# Patient Record
Sex: MALE | Employment: FULL TIME | URBAN - METROPOLITAN AREA
[De-identification: names, ages, dates, MRNs, and addresses within clinical notes are randomized per-mention and may not be internally consistent; named-entity substitution may affect disease eponyms.]

---

## 2017-07-02 ENCOUNTER — HOSPITAL ENCOUNTER (EMERGENCY)
Facility: CLINIC | Age: 47
Discharge: HOME | End: 2017-07-02
Attending: EMERGENCY MEDICINE | Admitting: EMERGENCY MEDICINE
Payer: OTHER MISCELLANEOUS

## 2017-07-02 ENCOUNTER — APPOINTMENT (OUTPATIENT)
Dept: GENERAL RADIOLOGY | Facility: CLINIC | Age: 47
End: 2017-07-02
Attending: EMERGENCY MEDICINE
Payer: OTHER MISCELLANEOUS

## 2017-07-02 VITALS
HEIGHT: 66 IN | OXYGEN SATURATION: 99 % | HEART RATE: 70 BPM | RESPIRATION RATE: 18 BRPM | DIASTOLIC BLOOD PRESSURE: 86 MMHG | WEIGHT: 240 LBS | BODY MASS INDEX: 38.57 KG/M2 | TEMPERATURE: 98 F | SYSTOLIC BLOOD PRESSURE: 137 MMHG

## 2017-07-02 DIAGNOSIS — S42.401A: ICD-10-CM

## 2017-07-02 DIAGNOSIS — S52.044A CLOSED NONDISPLACED FRACTURE OF CORONOID PROCESS OF RIGHT ULNA, INITIAL ENCOUNTER: ICD-10-CM

## 2017-07-02 DIAGNOSIS — S30.0XXA LUMBAR CONTUSION, INITIAL ENCOUNTER: ICD-10-CM

## 2017-07-02 PROCEDURE — 72100 X-RAY EXAM L-S SPINE 2/3 VWS: CPT

## 2017-07-02 PROCEDURE — 73080 X-RAY EXAM OF ELBOW: CPT | Mod: RT

## 2017-07-02 PROCEDURE — 29105 APPLICATION LONG ARM SPLINT: CPT | Mod: RT

## 2017-07-02 PROCEDURE — 25000132 ZZH RX MED GY IP 250 OP 250 PS 637: Performed by: EMERGENCY MEDICINE

## 2017-07-02 PROCEDURE — 99284 EMERGENCY DEPT VISIT MOD MDM: CPT | Mod: 25

## 2017-07-02 RX ORDER — IBUPROFEN 600 MG/1
600 TABLET, FILM COATED ORAL ONCE
Status: COMPLETED | OUTPATIENT
Start: 2017-07-02 | End: 2017-07-02

## 2017-07-02 RX ORDER — IBUPROFEN 200 MG
600 TABLET ORAL EVERY 6 HOURS PRN
Qty: 60 TABLET | Refills: 0 | Status: SHIPPED | OUTPATIENT
Start: 2017-07-02 | End: 2019-08-10

## 2017-07-02 RX ADMIN — IBUPROFEN 600 MG: 600 TABLET ORAL at 10:43

## 2017-07-02 NOTE — LETTER
[facility] EMERGENCY DEPARTMENT  [address]  [facility] [address]  [phone]    Parth Valdez  [address]   [facility] [address]  [phone] (home) none (work)    : 1970      To Whom it may concern:    Parth Valdez was seen in our Emergency Department today, 2017. He sustained an injury to his right arm that will make it difficult to perform his job duties until healed. The patient does have full use of his left arm, and if there are duties he can perform that do not require the use of his right arm, he can return in that limited capacity. It would be reasonable to allow him to rest for the next 1-2 days prior to returning to work. He should be evaluated by his primary care physician, an orthopedic surgeon, or an occupational medicine physician for determination of return to full duty.       Sincerely,            Mario Kiran D.O.

## 2017-07-02 NOTE — ED AVS SNAPSHOT
[facility] Emergency Department    [address]    [facility] [address]    Phone:  [phone]    Fax:  [phone]                                       Parth Valdez   MRN: [id number]    Department:  [facility] Emergency Department   Date of Visit:  7/2/2017           After Visit Summary Signature Page     I have received my discharge instructions, and my questions have been answered. I have discussed any challenges I see with this plan with the nurse or doctor.    ..........................................................................................................................................  Patient/Patient Representative Signature      ..........................................................................................................................................  Patient Representative Print Name and Relationship to Patient    ..................................................               ................................................  Date                                            Time    ..........................................................................................................................................  Reviewed by Signature/Title    ...................................................              ..............................................  Date                                                            Time

## 2017-07-02 NOTE — ED AVS SNAPSHOT
[facility] Emergency Department    [address] [facility] [address]    Phone:  [phone]    Fax:  [phone]                                       Parth Valdez   MRN: [id number]    Department:  [facility] Emergency Department   Date of Visit:  7/2/2017           Patient Information     Date Of Birth          1970        Your diagnoses for this visit were:     Closed nondisplaced fracture of coronoid process of right ulna, initial encounter     Occult fracture of elbow, right, closed, initial encounter        You were seen by Mario Kiran DO.      Follow-up Information     Call [facility].    Specialty:  Family Medicine    Why:  As needed, if you need to establish care with a new primary care physician    Contact information:    [address]  [facility] [address] [phone]        Follow up with [facility]. Call in 1 week.    Why:  For monitoring of fracture healing    Contact information:    [address]  [address]  [facility] [id number] [phone]        Follow up with [facility] Emergency Department.    Specialty:  EMERGENCY MEDICINE    Why:  If symptoms worsen    Contact information:    [address] [facility] [id number] [phone]        Discharge Instructions         Fractura de codo    Tiene zuhair fractura (rotura) en chele o más huesos de henry articulación del codo. Puede tratarse de zuhair pequeña grieta (fisura) en el hueso. O puede ser algo más lacie, con partes rotas que se salen de henry posición normal.  Esta fractura suele halima entre 4 y 12 semanas en sanar, según el tipo. El primer paso del tratamiento es colocarle zuhair férula o un yeso. En las fracturas graves, se necesita cirugía para que los fragmentos del hueso vuelvan a estar en henry lugar.  Cuidados en la casa  Siga estos consejos para cuidar de usted en henry  casa:    Mantenga el brazo en alto para reducir el dolor y la hinchazón. Cuando esté sentado o acostado, mantenga el brazo elevado por encima del nivel de henry corazón. Para eso, puede colocar henry brazo sobre un cojín apoyado sobre henry pecho. También puede colocarlo sobre un cojín a henry lado. Es muy importante que tomás eso suzanne los primeros dos días (48 horas) después del momento de la lesión.    Coloque zuhair compresa de hielo sobre la lily lesionada. Hágalo suzanne 20 minutos cada zuhair o dos horas el primer día. Para formar zuhair compresa de hielo, puede envolver con zuhair toalla delgada zuhair bolsa plástica con cubos de hielo. Puede colocar la compresa de hielo dentro del cabestrillo y directamente sobre la férula o el yeso. Siga usando la compresa de hielo dirk o cuatro veces al día en los dos lauren siguientes. Luego, úsela cuando lo necesite para aliviar el dolor y la inflamación.    Mantenga siempre el yeso o la férula completamente secos en todo momento. Cuando se duche o se bañe, proteja la férula o el yeso para que no se mojen. Cúbralos con zuhair bolsa plástica lacie, cerrada con cinta en la parte superior. Si la férula o el yeso de fibra de porfirio se humedecen, puede secarlos con un secador para el mohit.    Puede usar acetaminofén o ibuprofeno para controlar el dolor, a menos que le hayan recetado otro medicamento para calmar el dolor. Si tiene zuhair enfermedad crónica del hígado o de los riñones, consulte a henry proveedor antes de usar estos medicamentos. También hable con henry proveedor si tiene zuhair úlcera de estómago o sangrado gastrointestinal.    No ponga cremas ni objetos dentro del yeso si le pica.  Visita de control  Programe zuhair visita de control con henry proveedor de atención médica en zuhair semana, o según le hayan indicado. Es para asegurarse de que el hueso está recuperándose peter. Si le colocaron zuhair férula, quizás se la cambien por un yeso en la próxima visita de control.  Si le tomaron radiografías, las  evaluará un radiólogo. Le informarán de los nuevos hallazgos que puedan afectar henry atención médica.   Cuándo debe buscar atención médica?  Llame a henry proveedor de atención médica de inmediato si tiene cualquiera de los siguientes síntomas:    El yeso se agrieta    El yeso o la férula (de yeso) se humedecen o se ablandan    La férula o el yeso de fibra de porfirio permanecen húmedos por más de 24 horas    Siente más dolor debajo del yeso o la férula, o siente que el yeso o la férula le quedan más ajustados    Siente mal olor que viene del yeso o el líquido de la herida lina el yeso    Se le hinchan los dedos, o los siente fríos o entumecidos, se kaleb azulados, o siente cosquilleo en derrell lily    No puede  los dedos    La piel alrededor del yeso se ve enrojecida    Tiene fiebre de 101  F (38.3  C) o más, o según le haya indicado henry proveedor de atención médica  Date Last Reviewed: 2/15/2015    [id number] [employer]. [address] [place] [address]. Todos los derechos reservados. Esta información no pretende sustituir la atención médica profesional. Sólo henry médico puede diagnosticar y tratar un problema de forest.          24 Hour Appointment Hotline       To make an appointment at any [facility] clinic, call [phone] ([phone]). If you don't have a family doctor or clinic, we will help you find one. [facility] clinics are conveniently located to serve the needs of you and your family.             Review of your medicines      START taking        Dose / Directions Last dose taken    ibuprofen 200 MG tablet   Commonly known as:  ADVIL/MOTRIN   Dose:  600 mg   Quantity:  60 tablet        Take 3 tablets (600 mg) by mouth every 6 hours as needed for mild pain or fever   Refills:  0          Our records show that you are taking the medicines listed below. If these are incorrect, please call your family doctor or clinic.        Dose / Directions Last dose taken    COLD & HOT MEDICATED PATCH  5 % Pads   Quantity:  5 Each   Generic drug:  menthol        Apply to right shoulder as needed for pain relief   Refills:  0        NAPROSYN PO        Refills:  0        Phentermine HCl 30 MG Tbdp        Refills:  0                Prescriptions were sent or printed at these locations (1 Prescription)                   Other Prescriptions                Printed at Department/Unit printer (1 of 1)         ibuprofen (ADVIL/MOTRIN) 200 MG tablet                Procedures and tests performed during your visit     Elbow XR, G/E 3 views, right    Lumbar spine XR, 2-3 views      Orders Needing Specimen Collection     None      Pending Results     Date and Time Order Name Status Description    7/2/2017 1038 Elbow XR, G/E 3 views, right Preliminary     7/2/2017 1038 Lumbar spine XR, 2-3 views Preliminary             Pending Culture Results     No orders found from 6/30/2017 to 7/3/2017.            Pending Results Instructions     If you had any lab results that were not finalized at the time of your Discharge, you can call the ED Lab Result RN at [phone]. You will be contacted by this team for any positive Lab results or changes in treatment. The nurses are available 7 days a week from 10A to 6:30P.  You can leave a message 24 hours per day and they will return your call.        Test Results From Your Hospital Stay        7/2/2017 11:23 AM      Narrative     LUMBAR SPINE TWO TO THREE VIEWS  7/2/2017 11:08 AM     HISTORY: Fall, midline lumbar spine pain.    COMPARISON: None.        Impression     IMPRESSION: No acute fracture. Diffuse degenerative disc disease  change.         7/2/2017 11:23 AM      Narrative     RIGHT ELBOW THREE OR MORE VIEWS  7/2/2017 11:09 AM     HISTORY: Fall, right elbow injury.    COMPARISON: None.        Impression     IMPRESSION: Lateral view suggests a positive posterior fat-pad sign  adjacent to the distal humerus at the humerus that may be seen with  occult fracture. There is also very small  cortical irregularity along  the anterior aspect of the coronoid process that could represent a  small fracture fragment. No other displaced fracture visualized.  Anatomic alignment.                Clinical Quality Measure: Blood Pressure Screening     Your blood pressure was checked while you were in the emergency department today. The last reading we obtained was  BP: 139/81 . Please read the guidelines below about what these numbers mean and what you should do about them.  If your systolic blood pressure (the top number) is less than 120 and your diastolic blood pressure (the bottom number) is less than 80, then your blood pressure is normal. There is nothing more that you need to do about it.  If your systolic blood pressure (the top number) is 120-139 or your diastolic blood pressure (the bottom number) is 80-89, your blood pressure may be higher than it should be. You should have your blood pressure rechecked within a year by a primary care provider.  If your systolic blood pressure (the top number) is 140 or greater or your diastolic blood pressure (the bottom number) is 90 or greater, you may have high blood pressure. High blood pressure is treatable, but if left untreated over time it can put you at risk for heart attack, stroke, or kidney failure. You should have your blood pressure rechecked by a primary care provider within the next 4 weeks.  If your provider in the emergency department today gave you specific instructions to follow-up with your doctor or provider even sooner than that, you should follow that instruction and not wait for up to 4 weeks for your follow-up visit.        Thank you for choosing [facility]       Thank you for choosing [facility] for your care. Our goal is always to provide you with excellent care. Hearing back from our patients is one way we can continue to improve our services. Please take a few minutes to complete the written survey that you may receive in the mail after you  "visit with us. Thank you!        [employer] Information     [employer] lets you send messages to your doctor, view your test results, renew your prescriptions, schedule appointments and more. To sign up, go to [email or web address] . Click on \"Log in\" on the left side of the screen, which will take you to the Welcome page. Then click on \"Sign up Now\" on the right side of the page.     You will be asked to enter the access code listed below, as well as some personal information. Please follow the directions to create your username and password.     Your access code is: [email or web address]  Expires: 2017 12:28 PM     Your access code will  in 90 days. If you need help or a new code, please call your [facility] clinic or [phone].        Care EveryWhere ID     This is your Care EveryWhere ID. This could be used by other organizations to access your [facility] medical records  [id number]        Equal Access to Services     BEV VICTORIA : Hadii trinidad hamm hadasho Soermaali, waaxda luqadaha, qaybta kaalmada adeegyaavi, grant pantoja . So [facility] [phone].    ATENCIÓN: Si habla español, tiene a henry disposición servicios gratuitos de asistencia lingüística. Llame al [phone].    We comply with applicable federal civil rights laws and [place] laws. We do not discriminate on the basis of race, color, national origin, age, disability sex, sexual orientation or gender identity.            After Visit Summary       This is your record. Keep this with you and show to your community pharmacist(s) and doctor(s) at your next visit.                  "

## 2017-07-02 NOTE — ED PROVIDER NOTES
"  History     Chief Complaint:  Arm Pain and Fall    The patient is [place]-speaking. I used the language line for interpretation.    HPI: Parth Valdez is a 46 year old male who presents for evaluation of the above.  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  The patient denies hitting his head.  He denies any lightheadedness, syncope, nausea, vomiting.  He denies any weakness or numbness.      Allergies:  NKDA    Medications:    Phentermine (patient states he is still taking this)    Past Medical History:    None    Past Surgical History:    Umbilical Hernia    Family History:    Non-contributory    Social History:  Tobacco: No  Alcohol: No  Drugs: No    Review of Systems  Please see the HPI. A comprehensive 10 point review of systems was performed and is otherwise negative except as noted in HPI.     Physical Exam   Vital signs:  Patient Vitals for the past 24 hrs:   BP Temp Temp src Pulse Resp SpO2 Height Weight   07/02/17 1045 139/81 - - 68 18 99 % - -   07/02/17 1007 154/69 98  F (36.7  C) Oral 67 16 98 % 1.676 m (5' 6\") 108.9 kg (240 lb)       Constitutional: Patient appears well-developed and well-nourished. There is mild distress.   Head: No external signs of trauma noted.  Neck: No JVD noted  Eyes: Pupils are equal, round, and reactive to light.   Cardiovascular: Normal rate, regular rhythm and normal heart sounds.  Exam reveals no gallop and no friction rub.  No murmur heard. Normal peripheral pulses.  Pulmonary/Chest: Effort normal and breath sounds normal. No respiratory distress. Patient has no wheezes. Patient has no rales.   Abdominal: Soft. There is no tenderness.   Ext:  Right Arm    No TTP over distal radius or ulna  He can oppose thumb.  There is soft tissue swelling present over the right elbow  This is a closed injury  He is able to fire Hand/finger flexors and extensors.  There is normal strength against resistance  Sensation and perfusion " intact throughout hand    Back: Midline L-spine tenderness. No paraspinal tenderness  Neurological: Patient is alert and oriented to person, place, and time. Normal sensation to the RUE and right hand.  Skin: Skin is warm and dry. There is no diaphoresis noted.     Emergency Department Course     Imaging:    Lumbar spine XR, 2-3 views   Preliminary Result   IMPRESSION: No acute fracture. Diffuse degenerative disc disease   change.      Elbow XR, G/E 3 views, right   Preliminary Result   IMPRESSION: Lateral view suggests a positive posterior fat-pad sign   adjacent to the distal humerus at the humerus that may be seen with   occult fracture. There is also very small cortical irregularity along   the anterior aspect of the coronoid process that could represent a   small fracture fragment. No other displaced fracture visualized.   Anatomic alignment.          Interventions:  1043: Motrin 600 mg PO    Procedure:    Splint Placement:    A custom Ortho-Glass splint was placed on the patient's right upper extremity secondary to the above x-ray findings.  I assessed for neurovascular function prior to and after the placement of the splint.  The patient has normal sensation and circulation.  He did feel improvement of his symptoms after placement of the splint.  I expect that this splint can be definitive treatment for this patient's fracture.    Emergency Department Course:  The patient was brought to the ED and had the above H&P performed. The patient was sent for radiological imaging. See above for meds/radiology/procedures intervention.     The patient was informed of the results of the above labs/imaging studies.     The patient was discharged in stable condition      Impression & Plan      Medical Decision Making:  This 46-year-old male presents to the ED secondary to back pain and arm pain after a fall at work.  Please see the HPI for specifics.  The patient was found to have a posterior fat pad sign on his right  elbow x-ray as well as a possible coronoid process chip fracture.  I placed the patient in an Ortho-Glass splint.  He tolerated this very well.  The patient will be discharged and should follow-up in the outpatient setting.  He does not have a primary care provider that is listed so I did give him the on-call primary care clinic but I will also give him the orthopedics clinic for routine follow-up to monitor for healing of this.  Anticipatory guidance given prior to discharge to the patient using the language line.      Impression:    ICD-10-CM    1. Closed nondisplaced fracture of coronoid process of right ulna, initial encounter S52.044A    2. Occult fracture of elbow, right, closed, initial encounter S42.401A    3. Lumbar contusion, initial encounter S30.0XXA        Disposition:  discharged to home    Discharge Medications:  New Prescriptions    IBUPROFEN (ADVIL/MOTRIN) 200 MG TABLET    Take 3 tablets (600 mg) by mouth every 6 hours as needed for mild pain or fever         Mario Kiran D.O.  7/2/2017  [facility] EMERGENCY DEPARTMENT           Mario Kiran,   07/02/17 1235    Addendum: updated discharge impressions     Mario Kiran,   07/02/17 1236

## 2017-07-02 NOTE — ED NOTES
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  Patient alert and oriented x3.  Airway, breathing and circulation intact.

## 2017-07-02 NOTE — DISCHARGE INSTRUCTIONS
Contusión en la espalda  Usted tiene zuhair CONTUSIÓN de la espalda. Esta es zuhair magulladura con hinchazón y un poco de sangrado bajo la piel. La piel puede verse algo morada. También puede que sienta dolor y rigidez en la lily de la magulladura, phong no se ha roto ningún hueso.  Las contusiones sanan por sí solas, sin más tratamiento. No obstante, el dolor y el color diferente de la piel tardarán entre semanas y meses en desaparecer.    Cuidados en henry casa  1) Descanse. Evite levantar cosas pesadas, hacer actividades extenuantes o cualquier actividad que le cause dolor.  2) Aplique hielo sobre la lily para reducir el dolor y la inflamación. Coloque cubos de hielo en zuhair bolsa plástica o use zuhair compresa fría. (Envuelva el paquete frío en zuhair toalla delgada. No lo coloque directamente sobre la piel). Aplique sobre la lily lesionada por 20 minutos cada zuhair o dos horas el primer día. Siga aplicando hielo dirk o cuatro veces al día los dos lauren siguientes y luego según sea necesario para aliviar el dolor y la inflamación.  3) Shiner el medicamento analgésico que le hayan recetado. Si no le indicaron ninguno, puede halima acetaminofén, ibuprofeno o naproxeno para aliviar el dolor.  Visitas de control  Programe zuhair visita de control con henry proveedor de atención médica o según le hayan indicado. Llame si no se encuentra mejor al cabo de zuhair o dos semanas.  NOTA: si le tomaron radiografías, un radiólogo las revisará y se le notificará de cualquier nuevo hallazgo que pueda afectar henry atención.  Cuándo debe buscar atención médica  Llame a henry proveedor de atención médica si tiene alguno de los siguientes síntomas:       Dolor nuevo o que empeora    Más inflamación alrededor del moretón    El dolor se extiende a zuhair o ambas piernas    Debilidad o entumecimiento en zuhair o ambas piernas    Pérdida del control de la vejiga o los intestinos    Entumecimiento en la lily genital o la den    Fiebre de 100.4  F (38  C) o más, según le  haya indicado henry proveedor de atención médica  Date Last Reviewed: 6/26/2015 2000-2017 [employer]. [address] [place] [address], [place] [address]. Todos los derechos reservados. Esta información no pretende sustituir la atención médica profesional. Sólo henry médico puede diagnosticar y tratar un problema de forest.        Fractura de codo    Tiene zuhair fractura (rotura) en chele o más huesos de henry articulación del codo. Puede tratarse de zuhair pequeña grieta (fisura) en el hueso. O puede ser algo más lacie, con partes rotas que se salen de henry posición normal.  Esta fractura suele halima entre 4 y 12 semanas en sanar, según el tipo. El primer paso del tratamiento es colocarle zuhair férula o un yeso. En las fracturas graves, se necesita cirugía para que los fragmentos del hueso vuelvan a estar en henry lugar.  Cuidados en la casa  Siga estos consejos para cuidar de usted en henry casa:    Mantenga el brazo en alto para reducir el dolor y la hinchazón. Cuando esté sentado o acostado, mantenga el brazo elevado por encima del nivel de henry corazón. Para eso, puede colocar henry brazo sobre un cojín apoyado sobre henry pecho. También puede colocarlo sobre un cojín a henry lado. Es muy importante que tomás eso suzanne los primeros dos días (48 horas) después del momento de la lesión.    Coloque zuhair compresa de hielo sobre la lily lesionada. Hágalo suzanne 20 minutos cada zuhair o dos horas el primer día. Para formar zuhair compresa de hielo, puede envolver con zuhair toalla delgada zuhair bolsa plástica con cubos de hielo. Puede colocar la compresa de hielo dentro del cabestrillo y directamente sobre la férula o el yeso. Siga usando la compresa de hielo dirk o cuatro veces al día en los dos lauren siguientes. Luego, úsela cuando lo necesite para aliviar el dolor y la inflamación.    Mantenga siempre el yeso o la férula completamente secos en todo momento. Cuando se duche o se bañe, proteja la férula o el yeso para que no se mojen. Cúbralos con zuhair  bolsa plástica lacie, cerrada con cinta en la parte superior. Si la férula o el yeso de fibra de porfirio se humedecen, puede secarlos con un secador para el mohit.    Puede usar acetaminofén o ibuprofeno para controlar el dolor, a menos que le hayan recetado otro medicamento para calmar el dolor. Si tiene zuhair enfermedad crónica del hígado o de los riñones, consulte a henry proveedor antes de usar estos medicamentos. También hable con henry proveedor si tiene zuhair úlcera de estómago o sangrado gastrointestinal.    No ponga cremas ni objetos dentro del yeso si le pica.  Visita de control  Programe zuhair visita de control con henry proveedor de atención médica en zuhair semana, o según le hayan indicado. Es para asegurarse de que el hueso está recuperándose peter. Si le colocaron zuhair férula, quizás se la cambien por un yeso en la próxima visita de control.  Si le tomaron radiografías, las evaluará un radiólogo. Le informarán de los nuevos hallazgos que puedan afectar henry atención médica.   Cuándo debe buscar atención médica?  Llame a henry proveedor de atención médica de inmediato si tiene cualquiera de los siguientes síntomas:    El yeso se agrieta    El yeso o la férula (de yeso) se humedecen o se ablandan    La férula o el yeso de fibra de porfirio permanecen húmedos por más de 24 horas    Siente más dolor debajo del yeso o la férula, o siente que el yeso o la férula le quedan más ajustados    Siente mal olor que viene del yeso o el líquido de la herida lina el yeso    Se le hinchan los dedos, o los siente fríos o entumecidos, se kaleb azulados, o siente cosquilleo en derrell lily    No puede  los dedos    La piel alrededor del yeso se ve enrojecida    Tiene fiebre de 101  F (38.3  C) o más, o según le haya indicado henry proveedor de atención médica  Date Last Reviewed: 2/15/2015    [id number] [employer] [address] [place] [address], [place] [address]. Todos los derechos reservados. Esta información no pretende sustituir la  atención médica profesional. Sólo henry médico puede diagnosticar y tratar un problema de forest.

## 2017-12-20 ENCOUNTER — OFFICE VISIT (OUTPATIENT)
Dept: INTERNAL MEDICINE | Facility: CLINIC | Age: 47
End: 2017-12-20
Payer: COMMERCIAL

## 2017-12-20 VITALS
HEART RATE: 67 BPM | SYSTOLIC BLOOD PRESSURE: 132 MMHG | TEMPERATURE: 97.4 F | BODY MASS INDEX: 38.67 KG/M2 | DIASTOLIC BLOOD PRESSURE: 84 MMHG | WEIGHT: 239.6 LBS | OXYGEN SATURATION: 96 %

## 2017-12-20 DIAGNOSIS — M54.50 BILATERAL LOW BACK PAIN WITHOUT SCIATICA, UNSPECIFIED CHRONICITY: Primary | ICD-10-CM

## 2017-12-20 DIAGNOSIS — R30.0 DYSURIA: ICD-10-CM

## 2017-12-20 LAB
ALBUMIN UR-MCNC: NEGATIVE MG/DL
APPEARANCE UR: CLEAR
BILIRUB UR QL STRIP: NEGATIVE
COLOR UR AUTO: YELLOW
GLUCOSE UR STRIP-MCNC: NEGATIVE MG/DL
HGB UR QL STRIP: ABNORMAL
KETONES UR STRIP-MCNC: NEGATIVE MG/DL
LEUKOCYTE ESTERASE UR QL STRIP: NEGATIVE
NITRATE UR QL: NEGATIVE
PH UR STRIP: 5.5 PH (ref 5–7)
RBC #/AREA URNS AUTO: NORMAL /HPF
SOURCE: ABNORMAL
SP GR UR STRIP: 1.02 (ref 1–1.03)
UROBILINOGEN UR STRIP-ACNC: 0.2 EU/DL (ref 0.2–1)
WBC #/AREA URNS AUTO: NORMAL /HPF

## 2017-12-20 PROCEDURE — 81001 URINALYSIS AUTO W/SCOPE: CPT | Performed by: NURSE PRACTITIONER

## 2017-12-20 PROCEDURE — T1013 SIGN LANG/ORAL INTERPRETER: HCPCS | Mod: U3 | Performed by: NURSE PRACTITIONER

## 2017-12-20 PROCEDURE — 99214 OFFICE O/P EST MOD 30 MIN: CPT | Performed by: NURSE PRACTITIONER

## 2017-12-20 RX ORDER — CYCLOBENZAPRINE HCL 10 MG
5-10 TABLET ORAL 3 TIMES DAILY PRN
Qty: 30 TABLET | Refills: 1 | Status: SHIPPED | OUTPATIENT
Start: 2017-12-20 | End: 2018-01-02

## 2017-12-20 NOTE — NURSING NOTE
"Chief Complaint   Patient presents with     Back Pain     Lower. Radiates to sides when bending and to Rt leg. Offensive odor in urine x's 2 mo       Initial /84  Pulse 67  Temp 97.4  F (36.3  C) (Oral)  Wt 239 lb 9.6 oz (108.7 kg)  SpO2 96%  BMI 38.67 kg/m2 Estimated body mass index is 38.67 kg/(m^2) as calculated from the following:    Height as of 7/2/17: 5' 6\" (1.676 m).    Weight as of this encounter: 239 lb 9.6 oz (108.7 kg).  Medication Reconciliation: complete     Janeth Saleh CMA      "

## 2017-12-20 NOTE — MR AVS SNAPSHOT
"              After Visit Summary   12/20/2017    Parth Valdez    MRN: [id number]           Patient Information     Date Of Birth          1970        Visit Information        Provider Department      12/20/2017 8:00 AM Juan Pablo Issa Sherri Rubin, NP [facility]        Today's Diagnoses     Bilateral low back pain without sciatica, unspecified chronicity    -  1    Dysuria          Care Instructions    Muscle relaxant at bedtime  Consider PT  Push fluids.    Christina Melendez CNP            Follow-ups after your visit        Who to contact     If you have questions or need follow up information about today's clinic visit or your schedule please contact [facility] directly at [phone].  Normal or non-critical lab and imaging results will be communicated to you by MyChart, letter or phone within 4 business days after the clinic has received the results. If you do not hear from us within 7 days, please contact the clinic through [employer] or phone. If you have a critical or abnormal lab result, we will notify you by phone as soon as possible.  Submit refill requests through [employer] or call your pharmacy and they will forward the refill request to us. Please allow 3 business days for your refill to be completed.          Additional Information About Your Visit        MyChart Information     [employer] lets you send messages to your doctor, view your test results, renew your prescriptions, schedule appointments and more. To sign up, go to [email or web address] . Click on \"Log in\" on the left side of the screen, which will take you to the Welcome page. Then click on \"Sign up Now\" on the right side of the page.     You will be asked to enter the access code listed below, as well as some personal information. Please follow the directions to create your username and password.     Your access code is: [email or web address]  Expires: 3/20/2018  9:04 AM     Your access " code will  in 90 days. If you need help or a new code, please call your [facility] clinic or [phone].        Care EveryWhere ID     This is your Care EveryWhere ID. This could be used by other organizations to access your [facility] medical records  [id number]        Your Vitals Were     Pulse Temperature Pulse Oximetry BMI (Body Mass Index)          67 97.4  F (36.3  C) (Oral) 96% 38.67 kg/m2         Blood Pressure from Last 3 Encounters:   17 132/84   17 137/86   16 (!) 160/97    Weight from Last 3 Encounters:   17 239 lb 9.6 oz (108.7 kg)   17 240 lb (108.9 kg)   12/02/15 229 lb (103.9 kg)              We Performed the Following     UA reflex to Microscopic and Culture     Urine Microscopic          Today's Medication Changes          These changes are accurate as of: 17  9:04 AM.  If you have any questions, ask your nurse or doctor.               Start taking these medicines.        Dose/Directions    cyclobenzaprine 10 MG tablet   Commonly known as:  FLEXERIL   Used for:  Bilateral low back pain without sciatica, unspecified chronicity   Started by:  Christina Melendez NP        Dose:  5-10 mg   Take 0.5-1 tablets (5-10 mg) by mouth 3 times daily as needed for muscle spasms   Quantity:  30 tablet   Refills:  1         Stop taking these medicines if you haven't already. Please contact your care team if you have questions.     COLD & HOT MEDICATED PATCH 5 % Pads   Generic drug:  menthol   Stopped by:  Christina Melendez NP           NAPROSYN PO   Stopped by:  Christina Melendez NP           Phentermine HCl 30 MG Tbdp   Stopped by:  Christina Melendez NP                Where to get your medicines      These medications were sent to [facility] [address] [place] [id number]     Phone:  [phone]     cyclobenzaprine 10 MG tablet                Primary Care Provider Fax #    Physician No  Ref-Primary [phone]       No address on file        Equal Access to Services     BEV ESME : Hadii trinidad hamm cameron Gary, wajenniferda luprimitivosheila, chico douglass hammadmarlen, grant rain hayaaalexei cuevaluke mitchell ladeandrealexei charu. [facility] [phone].    ATENCIÓN: Si habla español, tiene a henry disposición servicios gratuitos de asistencia lingüística. Llame al [phone].    We comply with applicable federal civil rights laws and [place] laws. We do not discriminate on the basis of race, color, national origin, age, disability, sex, sexual orientation, or gender identity.            Thank you!     Thank you for choosing [facility]  for your care. Our goal is always to provide you with excellent care. Hearing back from our patients is one way we can continue to improve our services. Please take a few minutes to complete the written survey that you may receive in the mail after your visit with us. Thank you!             Your Updated Medication List - Protect others around you: Learn how to safely use, store and throw away your medicines at www.disposemymeds.org.          This list is accurate as of: 12/20/17  9:04 AM.  Always use your most recent med list.                   Brand Name Dispense Instructions for use Diagnosis    cyclobenzaprine 10 MG tablet    FLEXERIL    30 tablet    Take 0.5-1 tablets (5-10 mg) by mouth 3 times daily as needed for muscle spasms    Bilateral low back pain without sciatica, unspecified chronicity       ibuprofen 200 MG tablet    ADVIL/MOTRIN    60 tablet    Take 3 tablets (600 mg) by mouth every 6 hours as needed for mild pain or fever

## 2017-12-20 NOTE — PROGRESS NOTES
SUBJECTIVE:   Parth Valdez is a 47 year old male who presents to clinic today for the following health issues:    Back pain: Lower. Radiates to sides when bending and to Rt leg. Offensive odor in urine x's 2 m      Back Pain       Duration: 2 months        Specific cause: none    Description:   Location of pain: low back bilateral  Character of pain: dull ache  Pain radiation:radiates into the right leg  New numbness or weakness in legs, not attributed to pain:  no     Intensity: mild, moderate    History:   Pain interferes with job: No  History of back problems: recurrent self limited episodes of low back pain in the past  Any previous MRI or X-rays: None  Sees a specialist for back pain:  No  Therapies tried without relief: none    Alleviating factors:   Improved by: none      Precipitating factors:  Worsened by: Lifting and Bending    Functional and Psychosocial Screen (Courtney STarT Back):      Not performed today          Accompanying Signs & Symptoms:  Risk of Fracture:  None  Risk of Cauda Equina:  None  Risk of Infection:  None  Risk of Cancer:  None  Risk of Ankylosing Spondylitis:  Onset at age <35, male, AND morning back stiffness. no                   Genitourinary symptoms      Duration: 2 months    Description:  Dark urine with strong odor    Intensity:  mild    Accompanying signs and symptoms (fever/discharge/nausea/vomiting/back or abdominal pain):  None    History (frequent UTI's/kidney stones/prostate problems): None  Sexually active: YES    Precipitating or alleviating factors: None    Therapies tried and outcome: none           There is no problem list on file for this patient.    History reviewed. No pertinent surgical history.    Social History   Substance Use Topics     Smoking status: Never Smoker     Smokeless tobacco: Never Used     Alcohol use No     Family History   Problem Relation Age of Onset     Hypertension Mother      DIABETES Father          Current Outpatient  Prescriptions   Medication Sig Dispense Refill     cyclobenzaprine (FLEXERIL) 10 MG tablet Take 0.5-1 tablets (5-10 mg) by mouth 3 times daily as needed for muscle spasms 30 tablet 1     ibuprofen (ADVIL/MOTRIN) 200 MG tablet Take 3 tablets (600 mg) by mouth every 6 hours as needed for mild pain or fever 60 tablet 0     BP Readings from Last 3 Encounters:   12/20/17 132/84   07/02/17 137/86   04/01/16 (!) 160/97    Wt Readings from Last 3 Encounters:   12/20/17 239 lb 9.6 oz (108.7 kg)   07/02/17 240 lb (108.9 kg)   12/02/15 229 lb (103.9 kg)                        Reviewed and updated as needed this visit by clinical staffTobacco  Allergies  Meds  Med Hx  Surg Hx  Fam Hx  Soc Hx      Reviewed and updated as needed this visit by Provider         ROS:  C: NEGATIVE for fever, chills, change in weight  E/M: NEGATIVE for ear, mouth and throat problems  R: NEGATIVE for significant cough or SOB  CV: NEGATIVE for chest pain, palpitations or peripheral edema  ROS otherwise negative    OBJECTIVE:     /84  Pulse 67  Temp 97.4  F (36.3  C) (Oral)  Wt 239 lb 9.6 oz (108.7 kg)  SpO2 96%  BMI 38.67 kg/m2  Body mass index is 38.67 kg/(m^2).  GENERAL: healthy, alert and no distress  RESP: lungs clear to auscultation - no rales, rhonchi or wheezes  CV: regular rate and rhythm, normal S1 S2, no S3 or S4, no murmur, click or rub, no peripheral edema and peripheral pulses strong  ABDOMEN: soft, nontender, no hepatosplenomegaly, no masses and bowel sounds normal  NEURO: Normal strength and tone, mentation intact and speech normal  BACK: no CVA tenderness, no paralumbar tenderness  Comprehensive back pain exam:  No tenderness, Range of motion not limited by pain, Lower extremity reflexes within normal limits bilaterally and Straight leg raise negative bilaterally    Results for orders placed or performed in visit on 12/20/17 (from the past 24 hour(s))   UA reflex to Microscopic and Culture   Result Value Ref Range     Color Urine Yellow     Appearance Urine Clear     Glucose Urine Negative NEG^Negative mg/dL    Bilirubin Urine Negative NEG^Negative    Ketones Urine Negative NEG^Negative mg/dL    Specific Gravity Urine 1.020 1.003 - 1.035    Blood Urine Moderate (A) NEG^Negative    pH Urine 5.5 5.0 - 7.0 pH    Protein Albumin Urine Negative NEG^Negative mg/dL    Urobilinogen Urine 0.2 0.2 - 1.0 EU/dL    Nitrite Urine Negative NEG^Negative    Leukocyte Esterase Urine Negative NEG^Negative    Source Midstream Urine    Urine Microscopic   Result Value Ref Range    WBC Urine O - 2 OTO2^O - 2 /HPF    RBC Urine O - 2 OTO2^O - 2 /HPF       ASSESSMENT/PLAN:               ICD-10-CM    1. Bilateral low back pain without sciatica, unspecified chronicity M54.5 cyclobenzaprine (FLEXERIL) 10 MG tablet   2. Dysuria R30.0 UA reflex to Microscopic and Culture     Urine Microscopic       Patient Instructions   Muscle relaxant at bedtime  Consider PT  Push fluids.    Christina Melendez CNP        Christina Melendez, NP  [facility]

## 2017-12-22 ENCOUNTER — HOSPITAL ENCOUNTER (EMERGENCY)
Facility: CLINIC | Age: 47
Discharge: HOME | End: 2017-12-22
Attending: EMERGENCY MEDICINE | Admitting: EMERGENCY MEDICINE
Payer: COMMERCIAL

## 2017-12-22 ENCOUNTER — APPOINTMENT (OUTPATIENT)
Dept: CT IMAGING | Facility: CLINIC | Age: 47
End: 2017-12-22
Attending: EMERGENCY MEDICINE
Payer: COMMERCIAL

## 2017-12-22 VITALS
RESPIRATION RATE: 16 BRPM | SYSTOLIC BLOOD PRESSURE: 138 MMHG | OXYGEN SATURATION: 96 % | TEMPERATURE: 98.5 F | HEART RATE: 82 BPM | DIASTOLIC BLOOD PRESSURE: 82 MMHG

## 2017-12-22 DIAGNOSIS — R30.0 DYSURIA: ICD-10-CM

## 2017-12-22 DIAGNOSIS — R31.29 MICROSCOPIC HEMATURIA: ICD-10-CM

## 2017-12-22 LAB
ALBUMIN UR-MCNC: NEGATIVE MG/DL
ANION GAP SERPL CALCULATED.3IONS-SCNC: 9 MMOL/L (ref 3–14)
APPEARANCE UR: CLEAR
BASOPHILS # BLD AUTO: 0 10E9/L (ref 0–0.2)
BASOPHILS NFR BLD AUTO: 0.5 %
BILIRUB UR QL STRIP: NEGATIVE
BUN SERPL-MCNC: 12 MG/DL (ref 7–30)
CALCIUM SERPL-MCNC: 8.6 MG/DL (ref 8.5–10.1)
CHLORIDE SERPL-SCNC: 104 MMOL/L (ref 94–109)
CO2 SERPL-SCNC: 26 MMOL/L (ref 20–32)
COLOR UR AUTO: ABNORMAL
CREAT SERPL-MCNC: 0.78 MG/DL (ref 0.66–1.25)
DIFFERENTIAL METHOD BLD: NORMAL
EOSINOPHIL # BLD AUTO: 0.1 10E9/L (ref 0–0.7)
EOSINOPHIL NFR BLD AUTO: 1 %
ERYTHROCYTE [DISTWIDTH] IN BLOOD BY AUTOMATED COUNT: 13.3 % (ref 10–15)
GFR SERPL CREATININE-BSD FRML MDRD: >90 ML/MIN/1.7M2
GLUCOSE SERPL-MCNC: 90 MG/DL (ref 70–99)
GLUCOSE UR STRIP-MCNC: NEGATIVE MG/DL
HCT VFR BLD AUTO: 43.8 % (ref 40–53)
HGB BLD-MCNC: 15.6 G/DL (ref 13.3–17.7)
HGB UR QL STRIP: ABNORMAL
IMM GRANULOCYTES # BLD: 0 10E9/L (ref 0–0.4)
IMM GRANULOCYTES NFR BLD: 0.2 %
KETONES UR STRIP-MCNC: NEGATIVE MG/DL
LEUKOCYTE ESTERASE UR QL STRIP: NEGATIVE
LYMPHOCYTES # BLD AUTO: 1.3 10E9/L (ref 0.8–5.3)
LYMPHOCYTES NFR BLD AUTO: 22.1 %
MCH RBC QN AUTO: 30.1 PG (ref 26.5–33)
MCHC RBC AUTO-ENTMCNC: 35.6 G/DL (ref 31.5–36.5)
MCV RBC AUTO: 85 FL (ref 78–100)
MONOCYTES # BLD AUTO: 0.6 10E9/L (ref 0–1.3)
MONOCYTES NFR BLD AUTO: 9.4 %
MUCOUS THREADS #/AREA URNS LPF: PRESENT /LPF
NEUTROPHILS # BLD AUTO: 4 10E9/L (ref 1.6–8.3)
NEUTROPHILS NFR BLD AUTO: 66.8 %
NITRATE UR QL: NEGATIVE
NRBC # BLD AUTO: 0 10*3/UL
NRBC BLD AUTO-RTO: 0 /100
PH UR STRIP: 6 PH (ref 5–7)
PLATELET # BLD AUTO: 212 10E9/L (ref 150–450)
POTASSIUM SERPL-SCNC: 3.5 MMOL/L (ref 3.4–5.3)
RBC # BLD AUTO: 5.18 10E12/L (ref 4.4–5.9)
RBC #/AREA URNS AUTO: 5 /HPF (ref 0–2)
SODIUM SERPL-SCNC: 139 MMOL/L (ref 133–144)
SOURCE: ABNORMAL
SP GR UR STRIP: 1.01 (ref 1–1.03)
UROBILINOGEN UR STRIP-MCNC: 0 MG/DL (ref 0–2)
WBC # BLD AUTO: 6.1 10E9/L (ref 4–11)
WBC #/AREA URNS AUTO: 1 /HPF (ref 0–2)

## 2017-12-22 PROCEDURE — 81001 URINALYSIS AUTO W/SCOPE: CPT | Performed by: EMERGENCY MEDICINE

## 2017-12-22 PROCEDURE — 85025 COMPLETE CBC W/AUTO DIFF WBC: CPT | Performed by: EMERGENCY MEDICINE

## 2017-12-22 PROCEDURE — 87491 CHLMYD TRACH DNA AMP PROBE: CPT | Performed by: EMERGENCY MEDICINE

## 2017-12-22 PROCEDURE — 74176 CT ABD & PELVIS W/O CONTRAST: CPT

## 2017-12-22 PROCEDURE — 96360 HYDRATION IV INFUSION INIT: CPT

## 2017-12-22 PROCEDURE — 25000128 H RX IP 250 OP 636: Performed by: EMERGENCY MEDICINE

## 2017-12-22 PROCEDURE — 87591 N.GONORRHOEAE DNA AMP PROB: CPT | Performed by: EMERGENCY MEDICINE

## 2017-12-22 PROCEDURE — 80048 BASIC METABOLIC PNL TOTAL CA: CPT | Performed by: EMERGENCY MEDICINE

## 2017-12-22 PROCEDURE — 99284 EMERGENCY DEPT VISIT MOD MDM: CPT | Mod: 25

## 2017-12-22 RX ORDER — SODIUM CHLORIDE 9 MG/ML
1000 INJECTION, SOLUTION INTRAVENOUS CONTINUOUS
Status: DISCONTINUED | OUTPATIENT
Start: 2017-12-22 | End: 2017-12-22 | Stop reason: HOSPADM

## 2017-12-22 RX ORDER — HYDROCODONE BITARTRATE AND ACETAMINOPHEN 5; 325 MG/1; MG/1
1-2 TABLET ORAL EVERY 4 HOURS PRN
Qty: 15 TABLET | Refills: 0 | Status: SHIPPED | OUTPATIENT
Start: 2017-12-22 | End: 2018-01-02

## 2017-12-22 RX ADMIN — SODIUM CHLORIDE 1000 ML: 9 INJECTION, SOLUTION INTRAVENOUS at 17:25

## 2017-12-22 ASSESSMENT — ENCOUNTER SYMPTOMS
VOMITING: 0
FLANK PAIN: 0
DIARRHEA: 0
BACK PAIN: 1
CHILLS: 0
FEVER: 0
NAUSEA: 0
DYSURIA: 1
ABDOMINAL PAIN: 0
NECK PAIN: 0
DIAPHORESIS: 0
HEMATURIA: 0
DIFFICULTY URINATING: 1

## 2017-12-22 NOTE — ED NOTES
ABC's intact.  Alert and oriented x4.    Pt c/o painful frequent urination starting yesterday.  Denies fever.

## 2017-12-22 NOTE — ED AVS SNAPSHOT
" [facility] Emergency Department    [address]    [facility] [address]    Phone:  [phone]    Fax:  [phone]                                       Parth Valdez   MRN: [id number]    Department:  [facility] Emergency Department   Date of Visit:  12/22/2017           Patient Information     Date Of Birth          1970        Your diagnoses for this visit were:     Microscopic hematuria     Dysuria        You were seen by Prabhu Sood MD.      Follow-up Information     Follow up with [facility]. Go in 4 days.    Contact information:    [address] [facility]  [address]  [facility] [address] [phone]        Follow up with [facility]. Go in 1 week.    Specialties:  Sports Medicine, Pain Management, Obstetrics & Gynecology, Pediatrics, Internal Medicine, Nephrology    Contact information:    [address] [place] [address]  [facility] [address] [phone]        Discharge Instructions         Disuria     El dolor al orinar (disuria) suele ser consecuencia de un problema en las vías urinarias.   La disuria consiste en zuhair sensación dolorosa al orinar. Siga leyendo para obtener más información sobre la disuria y bailey tratamiento.   Cuáles son las causas de la disuria?  Las posibles causas incluyen:    Infección con zuhair bacteria o virus. Puede producir zuhair infección de las vías urinarias (\"UTI\", por janet siglas en inglés) o zuhair infección de transmisión sexual (\"STI\", por janet siglas en inglés).    Sensibilidad o alergias a ciertas sustancias químicas. Pueden estar en las lociones u otros productos.    Problemas de la próstata o de la vejiga.    Radioterapia en la lily pélvica.    Cómo se diagnostica la disuria?  Bailey proveedor de atención médica le hará un examen y le preguntará sobre janet síntomas y janet antecedentes de forest. Luego de realizarle un examen físico y de hablar con usted, " "probablemente henry proveedor de atención médica sabrá las causas de henry disuria. Generalmente le pedirá zuhair muestra de henry orina. Se realizan exámenes, o \"análisis de orina\" con derrell muestra. Ayala examen puede incluir: observar la muestra de orina (examen visual), verificar la presencia de sustancias (examen químico) y observar zuhair cantidad pequeña con un microscopio (examen microscópico). Algunas partes del análisis de orina se pueden realizar en el consultorio del proveedor y otras se harán en un laboratorio. También es posible que la muestra de orina se analice para determinar si tiene bacterias y hongos levaduriformes (cultivo de orina). Henry proveedor de atención médica le explicará con más detalle estas pruebas en maury de que usted las necesite.   Cómo se trata la disuria?  El tratamiento dependerá de la causa de henry disuria. Si la causa es zuhair infección por bacterias, es posible que le receten antibióticos (medicamentos para combatir la infección). También podrían darle medicamentos para facilitarle orinar y ayudarle a aliviar el dolor. Henry proveedor de atención médica puede darle más información sobre janet opciones de tratamiento. Si los síntomas se logan sin tratar, pueden empeorar.  Llame a henry proveedor de atención médica de inmediato en cualquiera de los siguientes casos:    Fiebre de 100.4  F (38.0  C) o superior    No hay mejoría después de dirk lauren de tratamiento    Dificultad para orinar a causa del dolor    Secreción nueva o en aumento procedente de la vagina o del pene    Erupción cutánea o dolor en las articulaciones    Dolor que va en aumento en la espalda o en el abdomen    Ganglios linfáticos agrandados (masas) y con dolor en las ingles   Date Last Reviewed: 9/24/2014 2000-2017 [employer] [address] [place] [id number]. Todos los derechos reservados. Esta información no pretende sustituir la atención médica profesional. Sólo henry médico puede diagnosticar y tratar un problema " de forest.          Gema En La Orina [Blood In The Urine]    La gema en la orina ( hematuria ) tiene muchas causas posibles. Si se presenta después de zuhair lesión (por ejemplo, por zuhair caída o un accidente de automóvil), suele ser zuhair señal de hematoma (bruising) en el riñón on en la vejiga. Las causas médicas comunes que pueden hacer que haya gema en la orina son: infección del tracto urinario (urinary tract infection), cálculo renal (kidney stone [dusty en los riñones]), inflamación (inflammation), tumores (tumors), u otras enfermedades del riñón o la vejiga. La menstruación (menstruation) puede hacer que aparezca gema en la muestra de orina, aunque no provenga del tracto urinario.  Si sólo hay presente un rastro de gema, aparecerá en la prueba de orina (urine test), aunque la orina se kerline de color amarillento y no spain ni rojizo. [place] puede ocurrir en cualquiera de los casos antes mencionados, así yunior cuando se grey practicado ejercicio físico muy doris o se tiene fiebre renita. En montrell maury, es posible que el médico le solicite repetir la prueba de orina otro día. Eso mostrará si todavía hay gema en la orina. De ser así, se pueden realizar otras pruebas para establecer la causa.  Cuidados En La [place]:  1. Si el aspecto de henry orina no demuestra que haya presencia de gema (la orina no es rosada, amarronada ni rojiza), entonces no es necesario que restrinja henry actividad en forma alguna.  2. Si puede horacio la presencia de gema en la orina, descanse y evite realizar actividades agotadoras hasta henry próxima prueba. No use aspirina, ni medicamentos anticoagulantes ni medicamentos antiplaquetarios ni medicamentos antiinflamatorios, tales yunior ibuprofeno (Advil o Motrin) o naproxeno (Aleve o Naprosyn). Estos medicamentos aligeran la gema y pueden aumentar el sangrado.  Visita de control     Programe zuhair visita de control con henry médico, o según le indique nuestro personal médico. Si tuvo zuhair lesión y tiene  gema en la orina, deberá repetir la prueba de orina en 1 o 2 días. Comuníquese con henry médico o regrese a joaquín centro para hacerse la prueba.     Un radiólogo evaluará las radiografías que le hayan hecho. Le informaremos de los nuevos hallazgos que puedan afectar la atención médica.  Busque Prontamente Atención Médica  si algo de lo siguiente ocurre:    Gema de color cortez vivo o coágulos de gema en la orina (si es un síntoma nuevo).    Debilidad, mareo o desmayo.    Dolor nuevo que se presenta en la den, el abdomen o la espalda.    Fiebre de 100.4 F (38 C) o más renita, o yunior le haya indicado henry proveedor de atención médica.    Vómito persistente.    Sangrado de la nariz o las encías; o se le hacen hematomas (moretones [bruising] con facilidad.  Date Last Reviewed: 12/20/2016 2000-2017 [employer]. [address] [place] [address], [place] [address]. Todos los derechos reservados. Esta información no pretende sustituir la atención médica profesional. Sólo henry médico puede diagnosticar y tratar un problema de forest.          24 Hour Appointment Hotline       To make an appointment at any [facility] clinic, call [phone] ([phone]). If you don't have a family doctor or clinic, we will help you find one. [facility] clinics are conveniently located to serve the needs of you and your family.             Review of your medicines      START taking        Dose / Directions Last dose taken    HYDROcodone-acetaminophen 5-325 MG per tablet   Commonly known as:  NORCO   Dose:  1-2 tablet   Quantity:  15 tablet        Take 1-2 tablets by mouth every 4 hours as needed for moderate to severe pain   Refills:  0          Our records show that you are taking the medicines listed below. If these are incorrect, please call your family doctor or clinic.        Dose / Directions Last dose taken    cyclobenzaprine 10 MG tablet   Commonly known as:  FLEXERIL   Dose:  5-10 mg   Quantity:  30 tablet        Take 0.5-1  tablets (5-10 mg) by mouth 3 times daily as needed for muscle spasms   Refills:  1        ibuprofen 200 MG tablet   Commonly known as:  ADVIL/MOTRIN   Dose:  600 mg   Quantity:  60 tablet        Take 3 tablets (600 mg) by mouth every 6 hours as needed for mild pain or fever   Refills:  0                Prescriptions were sent or printed at these locations (1 Prescription)                   Other Prescriptions                Printed at Department/Unit printer (1 of 1)         HYDROcodone-acetaminophen (NORCO) 5-325 MG per tablet                Procedures and tests performed during your visit     Basic metabolic panel    CBC with platelets differential    CT Abdomen Pelvis without Contrast (stone protocol)    Peripheral IV: Standard    UA with Microscopic      Orders Needing Specimen Collection     None      Pending Results     No orders found from 12/20/2017 to 12/23/2017.            Pending Culture Results     No orders found from 12/20/2017 to 12/23/2017.            Pending Results Instructions     If you had any lab results that were not finalized at the time of your Discharge, you can call the ED Lab Result RN at [phone]. You will be contacted by this team for any positive Lab results or changes in treatment. The nurses are available 7 days a week from 10A to 6:30P.  You can leave a message 24 hours per day and they will return your call.        Test Results From Your Hospital Stay        12/22/2017  2:57 PM      Component Results     Component Value Ref Range & Units Status    Color Urine Straw  Final    Appearance Urine Clear  Final    Glucose Urine Negative NEG^Negative mg/dL Final    Bilirubin Urine Negative NEG^Negative Final    Ketones Urine Negative NEG^Negative mg/dL Final    Specific Gravity Urine 1.010 1.003 - 1.035 Final    Blood Urine Moderate (A) NEG^Negative Final    pH Urine 6.0 5.0 - 7.0 pH Final    Protein Albumin Urine Negative NEG^Negative mg/dL Final    Urobilinogen mg/dL 0.0 0.0 - 2.0  mg/dL Final    Nitrite Urine Negative NEG^Negative Final    Leukocyte Esterase Urine Negative NEG^Negative Final    Source Midstream Urine  Final    WBC Urine 1 0 - 2 /HPF Final    RBC Urine 5 (H) 0 - 2 /HPF Final    Mucous Urine Present (A) NEG^Negative /LPF Final         12/22/2017  5:29 PM      Component Results     Component Value Ref Range & Units Status    WBC 6.1 4.0 - 11.0 10e9/L Final    RBC Count 5.18 4.4 - 5.9 10e12/L Final    Hemoglobin 15.6 13.3 - 17.7 g/dL Final    Hematocrit 43.8 40.0 - 53.0 % Final    MCV 85 78 - 100 fl Final    MCH 30.1 26.5 - 33.0 pg Final    MCHC 35.6 31.5 - 36.5 g/dL Final    RDW 13.3 10.0 - 15.0 % Final    Platelet Count 212 150 - 450 10e9/L Final    Diff Method Automated Method  Final    % Neutrophils 66.8 % Final    % Lymphocytes 22.1 % Final    % Monocytes 9.4 % Final    % Eosinophils 1.0 % Final    % Basophils 0.5 % Final    % Immature Granulocytes 0.2 % Final    Nucleated RBCs 0 0 /100 Final    Absolute Neutrophil 4.0 1.6 - 8.3 10e9/L Final    Absolute Lymphocytes 1.3 0.8 - 5.3 10e9/L Final    Absolute Monocytes 0.6 0.0 - 1.3 10e9/L Final    Absolute Eosinophils 0.1 0.0 - 0.7 10e9/L Final    Absolute Basophils 0.0 0.0 - 0.2 10e9/L Final    Abs Immature Granulocytes 0.0 0 - 0.4 10e9/L Final    Absolute Nucleated RBC 0.0  Final         12/22/2017  5:46 PM      Component Results     Component Value Ref Range & Units Status    Sodium 139 133 - 144 mmol/L Final    Potassium 3.5 3.4 - 5.3 mmol/L Final    Chloride 104 94 - 109 mmol/L Final    Carbon Dioxide 26 20 - 32 mmol/L Final    Anion Gap 9 3 - 14 mmol/L Final    Glucose 90 70 - 99 mg/dL Final    Urea Nitrogen 12 7 - 30 mg/dL Final    Creatinine 0.78 0.66 - 1.25 mg/dL Final    GFR Estimate >90 >60 mL/min/1.7m2 Final    Non  GFR Calc    GFR Estimate If Black >90 >60 mL/min/1.7m2 Final    African American GFR Calc    Calcium 8.6 8.5 - 10.1 mg/dL Final         12/22/2017  5:54 PM      Narrative     Exam: CT  ABDOMEN PELVIS W/O CONTRAST  12/22/2017 5:47 PM    History: Abdominal pain.    Comparison: 12/2/2015    Technique: Volumetric acquisition with reconstruction in the axial,  coronal planes through the abdomen and pelvis without contrast.  Radiation dose for this scan was reduced using automated exposure  control, adjustment of the mA and/or kV according to patient size, or  iterative reconstruction technique.    Contrast: None    Findings:   Lung Bases: Lung bases are clear. No pleural or pericardial effusion.    Abdomen: Unenhanced liver, spleen, gallbladder, adrenal glands,  kidneys and pancreas are normal. Specifically, no hydronephrosis or  hydroureter, no renal or ureteral calculi.    No areas of bowel wall thickening or bowel dilatation. Normal  appendix. No free fluid. No abdominal or pelvic lymphadenopathy.    Bones: No concerning lytic or sclerotic lesions.        Impression     Impression: Normal unenhanced CT of the abdomen and pelvis without  findings to suggest an etiology of the patient's symptoms.    LUCAS TESFAYE MD                Clinical Quality Measure: Blood Pressure Screening     Your blood pressure was checked while you were in the emergency department today. The last reading we obtained was  BP: 138/82 . Please read the guidelines below about what these numbers mean and what you should do about them.  If your systolic blood pressure (the top number) is less than 120 and your diastolic blood pressure (the bottom number) is less than 80, then your blood pressure is normal. There is nothing more that you need to do about it.  If your systolic blood pressure (the top number) is 120-139 or your diastolic blood pressure (the bottom number) is 80-89, your blood pressure may be higher than it should be. You should have your blood pressure rechecked within a year by a primary care provider.  If your systolic blood pressure (the top number) is 140 or greater or your diastolic blood pressure (the bottom  "number) is 90 or greater, you may have high blood pressure. High blood pressure is treatable, but if left untreated over time it can put you at risk for heart attack, stroke, or kidney failure. You should have your blood pressure rechecked by a primary care provider within the next 4 weeks.  If your provider in the emergency department today gave you specific instructions to follow-up with your doctor or provider even sooner than that, you should follow that instruction and not wait for up to 4 weeks for your follow-up visit.        Thank you for choosing [facility]       Thank you for choosing [facility] for your care. Our goal is always to provide you with excellent care. Hearing back from our patients is one way we can continue to improve our services. Please take a few minutes to complete the written survey that you may receive in the mail after you visit with us. Thank you!        [employer] Information     [employer] lets you send messages to your doctor, view your test results, renew your prescriptions, schedule appointments and more. To sign up, go to [email or web address] . Click on \"Log in\" on the left side of the screen, which will take you to the Welcome page. Then click on \"Sign up Now\" on the right side of the page.     You will be asked to enter the access code listed below, as well as some personal information. Please follow the directions to create your username and password.     Your access code is: [email or web address]  Expires: 3/20/2018  9:04 AM     Your access code will  in 90 days. If you need help or a new code, please call your [facility] clinic or [phone].        Care EveryWhere ID     This is your Care EveryWhere ID. This could be used by other organizations to access your [facility] medical records  [id number]        Equal Access to Services     BEV VICTORIA : Artie Gary, ivan lee, grant delarosa. So william " [phone].    ATENCIÓN: Si habla español, tiene a henry disposición servicios gratuitos de asistencia lingüística. Llame al [phone].    We comply with applicable federal civil rights laws and [place] laws. We do not discriminate on the basis of race, color, national origin, age, disability, sex, sexual orientation, or gender identity.            After Visit Summary       This is your record. Keep this with you and show to your community pharmacist(s) and doctor(s) at your next visit.

## 2017-12-22 NOTE — ED PROVIDER NOTES
"  History     Chief Complaint:  Dysuria    HPI   History is limited due to a language barrier. A  is used.    Parth Valdez is a 47 year old male who presents to the ED for evaluation of dysuria. The patient reports that he was seen in clinic yesterday by Christina Melendez NP for urinary symptoms. He reports that they did not find anything remarkable in his urine sample. He was discharged with Flexeril for muscle spasms and he reports some relief with this. Today, he reports having dysuria and difficulty urinating. He states that he feels like he needs to go but \"has to strain\" to do so. He also notes low back pain and notes the pain moves to the right side slightly with movement. He denies any back pain now while in the ED. He denies any hematuria, fever, nausea, vomiting, diarrhea, abdominal pain, flank pain, or any other symptoms.    The 's number was [id number].    Allergies:  No known drug allergies    Medications:    Flexeril  Ibuprofen    Past Medical History:    Hernia    Past Surgical History:    Hernia repair    Family History:    Hypertension  Diabetes    Social History:  Smoking status: Never  Alcohol use: No  Marital Status:       Review of Systems   Constitutional: Negative for chills, diaphoresis and fever.   Gastrointestinal: Negative for abdominal pain, diarrhea, nausea and vomiting.   Genitourinary: Positive for decreased urine volume, difficulty urinating and dysuria. Negative for flank pain and hematuria.   Musculoskeletal: Positive for back pain (low). Negative for neck pain.   All other systems reviewed and are negative.    Physical Exam   Patient Vitals for the past 24 hrs:   BP Temp Temp src Pulse Resp SpO2   12/22/17 1630 - - - - - 98 %   12/22/17 1627 138/86 - - - - -   12/22/17 1343 (!) 165/91 98.5  F (36.9  C) Oral 82 16 98 %     Physical Exam   Constitutional: He appears well-developed and well-nourished.   HENT:   Right Ear: External " ear normal.   Left Ear: External ear normal.   Mouth/Throat: Oropharynx is clear and moist. No oropharyngeal exudate.   TM's clear bilaterally   Eyes: Conjunctivae are normal. Pupils are equal, round, and reactive to light. No scleral icterus.   Neck: Normal range of motion. Neck supple.   Cardiovascular: Normal rate, regular rhythm, normal heart sounds and intact distal pulses.  Exam reveals no gallop and no friction rub.    No murmur heard.  Pulmonary/Chest: Effort normal and breath sounds normal. No respiratory distress. He has no wheezes. He has no rales.   Abdominal: Soft. Bowel sounds are normal. He exhibits no distension and no mass. There is no tenderness.   Mild low left flank pain   Musculoskeletal: Normal range of motion. He exhibits no edema.   Neurological: He is alert.   Skin: Skin is warm and dry. No rash noted.   Psychiatric: He has a normal mood and affect.   Nursing note and vitals reviewed.    Emergency Department Course     Imaging:  Radiographic findings were communicated with the patient who voiced understanding of the findings.    CT-scan Abdomen/Pelvis w/o contrast:  Impression: Normal unenhanced CT of the abdomen and pelvis without  findings to suggest an etiology of the patient's symptoms.  Preliminary result per radiology.     Laboratory:  UA: Moderate blood, Mucous present, RBC 5 (H), o/w Negative  CBC: WNL (WBC 6.1, HGB 15.6, )   BMP: WNL (Creatinine 0.78)    Interventions:  1725: NS 1L IV Bolus    Emergency Department Course:  Past medical records, nursing notes, and vitals reviewed.  1640: I performed an exam of the patient and obtained history, as documented above. GCS 15.    IV inserted and blood drawn.    The patient was sent for a CT-scan while in the emergency department, findings above.    1836: I rechecked the patient. Findings and plan explained to the Patient. Patient discharged home with instructions regarding supportive care, medications, and reasons to return. The  importance of close follow-up was reviewed.     Impression & Plan      Medical Decision Making:  Patient presents with dysuria and some vague back pain. Patient otherwise appears comfortable. He has 5 RBC's in his urine so we did go ahead and rule out renal colic with the hematuria. So far, workup has been negative. I did go ahead and add on STD test even though patient stated that he does not believe he is at risk. Patient will be referred to urology due to microscopic hematuria as well as dysuria. We will have him follow up next week and is sent home with a prescription for Norco for pain if needed. Otherwise, he should return if symptoms worsen.    Diagnosis:    ICD-10-CM   1. Microscopic hematuria R31.29   2. Dysuria R30.0       Disposition:  discharged to home    Discharge Medications:  New Prescriptions    HYDROCODONE-ACETAMINOPHEN (NORCO) 5-325 MG PER TABLET    Take 1-2 tablets by mouth every 4 hours as needed for moderate to severe pain         Sabine Menezes  12/22/2017   [facility] EMERGENCY DEPARTMENT  I, Sabine Menezes, am serving as a scribe at 4:40 PM on 12/22/2017 to document services personally performed by Prabhu Sood MD based on my observations and the provider's statements to me.        Prabhu Sood MD  12/22/17 9467

## 2017-12-23 NOTE — DISCHARGE INSTRUCTIONS
"  Disuria     El dolor al orinar (disuria) suele ser consecuencia de un problema en las vías urinarias.   La disuria consiste en zuhair sensación dolorosa al orinar. Siga leyendo para obtener más información sobre la disuria y henry tratamiento.   Cuáles son las causas de la disuria?  Las posibles causas incluyen:    Infección con zuhair bacteria o virus. Puede producir zuhair infección de las vías urinarias (\"UTI\", por janet siglas en inglés) o zuhair infección de transmisión sexual (\"STI\", por janet siglas en inglés).    Sensibilidad o alergias a ciertas sustancias químicas. Pueden estar en las lociones u otros productos.    Problemas de la próstata o de la vejiga.    Radioterapia en la lily pélvica.    Cómo se diagnostica la disuria?  Henry proveedor de atención médica le hará un examen y le preguntará sobre janet síntomas y janet antecedentes de forest. Luego de realizarle un examen físico y de hablar con usted, probablemente henry proveedor de atención médica sabrá las causas de henry disuria. Generalmente le pedirá zuhair muestra de henry orina. Se realizan exámenes, o \"análisis de orina\" con derrell muestra. Ayala examen puede incluir: observar la muestra de orina (examen visual), verificar la presencia de sustancias (examen químico) y observar zuhair cantidad pequeña con un microscopio (examen microscópico). Algunas partes del análisis de orina se pueden realizar en el consultorio del proveedor y otras se harán en un laboratorio. También es posible que la muestra de orina se analice para determinar si tiene bacterias y hongos levaduriformes (cultivo de orina). Henry proveedor de atención médica le explicará con más detalle estas pruebas en maury de que usted las necesite.   Cómo se trata la disuria?  El tratamiento dependerá de la causa de henry disuria. Si la causa es zuhair infección por bacterias, es posible que le receten antibióticos (medicamentos para combatir la infección). También podrían darle medicamentos para facilitarle orinar y ayudarle a aliviar el " dolor. Henry proveedor de atención médica puede darle más información sobre janet opciones de tratamiento. Si los síntomas se logan sin tratar, pueden empeorar.  Llame a henry proveedor de atención médica de inmediato en cualquiera de los siguientes casos:    Fiebre de 100.4  F (38.0  C) o superior    No hay mejoría después de dirk lauren de tratamiento    Dificultad para orinar a causa del dolor    Secreción nueva o en aumento procedente de la vagina o del pene    Erupción cutánea o dolor en las articulaciones    Dolor que va en aumento en la espalda o en el abdomen    Ganglios linfáticos agrandados (masas) y con dolor en las ingles   Date Last Reviewed: 9/24/2014 2000-2017 [employer]. [address] [place] [address]. Todos los derechos reservados. Esta información no pretende sustituir la atención médica profesional. Sólo henry médico puede diagnosticar y tratar un problema de forest.          Gema En La Orina [Blood In The Urine]    La gema en la orina ( hematuria ) tiene muchas causas posibles. Si se presenta después de zuhair lesión (por ejemplo, por zuhair caída o un accidente de automóvil), suele ser zuhair señal de hematoma (bruising) en el riñón on en la vejiga. Las causas médicas comunes que pueden hacer que haya gema en la orina son: infección del tracto urinario (urinary tract infection), cálculo renal (kidney stone [dusty en los riñones]), inflamación (inflammation), tumores (tumors), u otras enfermedades del riñón o la vejiga. La menstruación (menstruation) puede hacer que aparezca gema en la muestra de orina, aunque no provenga del tracto urinario.  Si sólo hay presente un rastro de gema, aparecerá en la prueba de orina (urine test), aunque la orina se kerline de color amarillento y no spain ni rojizo. Magee puede ocurrir en cualquiera de los casos antes mencionados, así yunior cuando se grey practicado ejercicio físico muy doris o se tiene fiebre renita. En montrell maury, es posible que el médico  le solicite repetir la prueba de orina otro día. Eso mostrará si todavía hay gema en la orina. De ser así, se pueden realizar otras pruebas para establecer la causa.  Cuidados En La [place]:  1. Si el aspecto de henry orina no demuestra que haya presencia de gema (la orina no es rosada, amarronada ni rojiza), entonces no es necesario que restrinja henry actividad en forma alguna.  2. Si puede horacio la presencia de gema en la orina, descanse y evite realizar actividades agotadoras hasta henry próxima prueba. No use aspirina, ni medicamentos anticoagulantes ni medicamentos antiplaquetarios ni medicamentos antiinflamatorios, tales yunior ibuprofeno (Advil o Motrin) o naproxeno (Aleve o Naprosyn). Estos medicamentos aligeran la gema y pueden aumentar el sangrado.  Visita de control     Programe zuhair visita de control con henry médico, o según le indique nuestro personal médico. Si tuvo zuhair lesión y tiene gema en la orina, deberá repetir la prueba de orina en 1 o 2 días. Comuníquese con henry médico o regrese a joaquín centro para hacerse la prueba.     Un radiólogo evaluará las radiografías que le hayan hecho. Le informaremos de los nuevos hallazgos que puedan afectar la atención médica.  Busque Prontamente Atención Médica  si algo de lo siguiente ocurre:    Gema de color cortez vivo o coágulos de gema en la orina (si es un síntoma nuevo).    Debilidad, mareo o desmayo.    Dolor nuevo que se presenta en la den, el abdomen o la espalda.    Fiebre de 100.4 F (38 C) o más renita, o yunior le haya indicado henry proveedor de atención médica.    Vómito persistente.    Sangrado de la nariz o las encías; o se le hacen hematomas (moretones [bruising] con facilidad.  Date Last Reviewed: 12/20/2016 2000-2017 [employer]. [address] [place] [address], [place] [address]. Todos los derechos reservados. Esta información no pretende sustituir la atención médica profesional. Sólo henry médico puede diagnosticar y tratar un problema de  forest.

## 2017-12-24 LAB
C TRACH DNA SPEC QL NAA+PROBE: NEGATIVE
N GONORRHOEA DNA SPEC QL NAA+PROBE: NEGATIVE
SPECIMEN SOURCE: NORMAL
SPECIMEN SOURCE: NORMAL

## 2018-01-02 ENCOUNTER — OFFICE VISIT (OUTPATIENT)
Dept: UROLOGY | Facility: CLINIC | Age: 48
End: 2018-01-02
Payer: COMMERCIAL

## 2018-01-02 VITALS
DIASTOLIC BLOOD PRESSURE: 80 MMHG | SYSTOLIC BLOOD PRESSURE: 122 MMHG | HEIGHT: 66 IN | HEART RATE: 78 BPM | WEIGHT: 230 LBS | BODY MASS INDEX: 36.96 KG/M2

## 2018-01-02 DIAGNOSIS — R30.0 DYSURIA: Primary | ICD-10-CM

## 2018-01-02 DIAGNOSIS — R39.16 STRAINS TO URINATE: ICD-10-CM

## 2018-01-02 DIAGNOSIS — R31.29 MICROSCOPIC HEMATURIA: Primary | ICD-10-CM

## 2018-01-02 LAB
ALBUMIN UR-MCNC: NEGATIVE MG/DL
APPEARANCE UR: CLEAR
BILIRUB UR QL STRIP: NEGATIVE
COLOR UR AUTO: YELLOW
GLUCOSE UR STRIP-MCNC: NEGATIVE MG/DL
HGB UR QL STRIP: ABNORMAL
KETONES UR STRIP-MCNC: NEGATIVE MG/DL
LEUKOCYTE ESTERASE UR QL STRIP: NEGATIVE
NITRATE UR QL: NEGATIVE
PH UR STRIP: 5.5 PH (ref 5–7)
RESIDUAL VOLUME (RV) (EXTERNAL): 19
SOURCE: ABNORMAL
SP GR UR STRIP: 1.02 (ref 1–1.03)
UROBILINOGEN UR STRIP-ACNC: 0.2 EU/DL (ref 0.2–1)

## 2018-01-02 PROCEDURE — 99243 OFF/OP CNSLTJ NEW/EST LOW 30: CPT | Performed by: PHYSICIAN ASSISTANT

## 2018-01-02 PROCEDURE — 51798 US URINE CAPACITY MEASURE: CPT | Performed by: PHYSICIAN ASSISTANT

## 2018-01-02 PROCEDURE — 81003 URINALYSIS AUTO W/O SCOPE: CPT | Performed by: PHYSICIAN ASSISTANT

## 2018-01-02 PROCEDURE — 88112 CYTOPATH CELL ENHANCE TECH: CPT | Performed by: PHYSICIAN ASSISTANT

## 2018-01-02 RX ORDER — TAMSULOSIN HYDROCHLORIDE 0.4 MG/1
0.4 CAPSULE ORAL DAILY
Qty: 90 CAPSULE | Refills: 3 | Status: SHIPPED | OUTPATIENT
Start: 2018-01-02 | End: 2018-07-26

## 2018-01-02 ASSESSMENT — PAIN SCALES - GENERAL: PAINLEVEL: MILD PAIN (3)

## 2018-01-02 NOTE — LETTER
1/2/2018       RE: Parth Valdez  [address]   [facility] [address]     Dear Colleague,    Thank you for referring your patient, Parth Valdez, to the [facility] [place] at [facility]. Please see a copy of my visit note below.    CC: Hematuria, straining to void.    HPI: It is a pleasure to see . Parth Valdez, a 47 year old male seen today in the urology clinic in consultation from Dr. Sood for evaluation of micro hematuria. This was first detected on 12/22/17 in the ER when the pt presented with concerns about needing to strain to void. CT w/o contrast was unremarkable.     The patient denies any gross hematuria. Previous smoking history. Voids but with hesitancy, intermittency, and intermittent sensation of incomplete emptying. Reports nocturia of 2-3.  He currently denies any dysuria, pyuria, feelings of incomplete emptying, or any recent history of urinary tract infections or stones.    The patient is Wolof-speaking and presents today with an  who aids in obtaining the history.      Hematuria Risk Factors:  Age >40: Yes   Smoking history: Yes, quit several years ago  Occupational exposure to chemicals or dyes (ie, benzenes, aromatic amines): no  History of urologic disorder or disease: no  History of irritative voiding symptoms: no  History of urinary tract infection: no  Analgesic abuse: no  History of pelvic irradiation: no    Past Medical History:   Diagnosis Date     Hernia, abdominal      Past Surgical History:   Procedure Laterality Date     HERNIA REPAIR       Current Outpatient Prescriptions   Medication Sig Dispense Refill     ibuprofen (ADVIL/MOTRIN) 200 MG tablet Take 3 tablets (600 mg) by mouth every 6 hours as needed for mild pain or fever 60 tablet 0     No Known Allergies  FAMILY HISTORY: There is no reported history of genitourinary carcinoma.  There is no  "history of urolithiasis.      SOCIAL HISTORY: The patient does not smoke cigarettes. Denies ETOH and illicit drug abuse.      ROS: A comprehensive 14 point ROS was obtained and  otherwise negative except for that outlined above in the HPI.    PHYSICAL EXAM:   Vitals:    01/02/18 0916   BP: 122/80   BP Location: Right arm   Patient Position: Chair   Cuff Size: Adult Regular   Pulse: 78   Weight: 104.3 kg (230 lb)   Height: 1.676 m (5' 6\")     GENERAL: Well groomed, well developed, well nourished male in NAD.  HEENT: AT, NC, EOMI bilaterally.  SKIN: Warm to touch, dry.  No visible rashes or lesions on examined areas.  RESP: No increased respiratory effort.  MS: Full ROM in extremities.  : Deferred for now.  KATHERINE: normal tone, no masses, (medium) prostate without nodules or tenderness  NEURO: Alert and oriented x 3.  PSYCH: Normal mood and affect, pleasant and agreeable during interview and exam.    LABS: U/A small blood    PVR 19cc    ASSESSMENT and PLAN:    Mr. Parth Valdez is a pleasant 47 year old male with micro hematuria and BPH with LUTS.  The differential diagnosis at this point includes stone disease, infection, BPH, prostatitis, urothelial malignancy, renal disorder versus another yet unknown diagnosis.  -Start Flomax  Also recommend proceeding with comprehensive hematuria evaluation to include:  - Urinalysis.   - Urine cytology to look for cells concerning for malignancy.  - CT w/ for upper tract imaging.  - Cystoscopy with the first available urologist to evaluate the interior of the bladder. Follow up for hematuria as recommended by urologist performing cystoscopic evaluation.    Thank you for allowing me to participate in Mr. Dre Valdez's care.      30 minutes were spent with the patient today, > 50% in counseling and coordination of care.    Dorys Rodriguez PA-C  [facility]          Again, thank you for allowing me to participate in the care of your patient.  "     Sincerely,    Dorys Rodriguez PA-C, PA-C

## 2018-01-02 NOTE — PATIENT INSTRUCTIONS
-Urinalysis  - Urine cytology to look for abnormal cells.  - CT scan. Please call [phone] to schedule this at [facility] or [facility]. This should be done 1-2 days prior to appointment with the Urologist.   - Cystoscopy with the first available urologist to evaluate the interior of the bladder. Follow up as recommended by urologist performing evaluation.

## 2018-01-02 NOTE — PROGRESS NOTES
CC: Hematuria, straining to void.    HPI: It is a pleasure to see . Parth Valdez, a 47 year old male seen today in the urology clinic in consultation from Dr. Sood for evaluation of micro hematuria. This was first detected on 12/22/17 in the ER when the pt presented with concerns about needing to strain to void. CT w/o contrast was unremarkable.     The patient denies any gross hematuria. Previous smoking history. Voids but with hesitancy, intermittency, and intermittent sensation of incomplete emptying. Reports nocturia of 2-3.  He currently denies any dysuria, pyuria, feelings of incomplete emptying, or any recent history of urinary tract infections or stones.    The patient is Emirati-speaking and presents today with an  who aids in obtaining the history.      Hematuria Risk Factors:  Age >40: Yes   Smoking history: Yes, quit several years ago  Occupational exposure to chemicals or dyes (ie, benzenes, aromatic amines): no  History of urologic disorder or disease: no  History of irritative voiding symptoms: no  History of urinary tract infection: no  Analgesic abuse: no  History of pelvic irradiation: no    Past Medical History:   Diagnosis Date     Hernia, abdominal      Past Surgical History:   Procedure Laterality Date     HERNIA REPAIR       Current Outpatient Prescriptions   Medication Sig Dispense Refill     ibuprofen (ADVIL/MOTRIN) 200 MG tablet Take 3 tablets (600 mg) by mouth every 6 hours as needed for mild pain or fever 60 tablet 0     No Known Allergies  FAMILY HISTORY: There is no reported history of genitourinary carcinoma.  There is no history of urolithiasis.      SOCIAL HISTORY: The patient does not smoke cigarettes. Denies ETOH and illicit drug abuse.      ROS: A comprehensive 14 point ROS was obtained and  otherwise negative except for that outlined above in the HPI.    PHYSICAL EXAM:   Vitals:    01/02/18 0916   BP: 122/80   BP Location: Right arm   Patient Position:  "Chair   Cuff Size: Adult Regular   Pulse: 78   Weight: 104.3 kg (230 lb)   Height: 1.676 m (5' 6\")     GENERAL: Well groomed, well developed, well nourished male in NAD.  HEENT: AT, NC, EOMI bilaterally.  SKIN: Warm to touch, dry.  No visible rashes or lesions on examined areas.  RESP: No increased respiratory effort.  MS: Full ROM in extremities.  : Deferred for now.  KATHERINE: normal tone, no masses, (medium) prostate without nodules or tenderness  NEURO: Alert and oriented x 3.  PSYCH: Normal mood and affect, pleasant and agreeable during interview and exam.    LABS: U/A small blood    PVR 19cc    ASSESSMENT and PLAN:    Mr. Parth Valdze is a pleasant 47 year old male with micro hematuria and BPH with LUTS.  The differential diagnosis at this point includes stone disease, infection, BPH, prostatitis, urothelial malignancy, renal disorder versus another yet unknown diagnosis.  -Start Flomax  Also recommend proceeding with comprehensive hematuria evaluation to include:  - Urinalysis.   - Urine cytology to look for cells concerning for malignancy.  - CT w/ for upper tract imaging.  - Cystoscopy with the first available urologist to evaluate the interior of the bladder. Follow up for hematuria as recommended by urologist performing cystoscopic evaluation.    Thank you for allowing me to participate in Mr. Dre Valdez's care.      30 minutes were spent with the patient today, > 50% in counseling and coordination of care.    Dorys Rodriguez PA-C  [facility]        "

## 2018-01-02 NOTE — MR AVS SNAPSHOT
After Visit Summary   1/2/2018    Parth Valdez    MRN: [id number]           Patient Information     Date Of Birth          1970        Visit Information        Provider Department      1/2/2018 8:45 AM Dorys Rodriguez PA-C; [employer] [facility]        Today's Diagnoses     Microscopic hematuria    -  1      Care Instructions     -Urinalysis  - Urine cytology to look for abnormal cells.  - CT scan. Please call [phone] to schedule this at [facility] or [facility]. This should be done 1-2 days prior to appointment with the Urologist.   - Cystoscopy with the first available urologist to evaluate the interior of the bladder. Follow up as recommended by urologist performing evaluation.            Follow-ups after your visit        Your next 10 appointments already scheduled     Jan 09, 2018  8:30 AM CST   (Arrive by 8:15 AM)   CT ABDOMEN PELVIS W CONTRAST with [address] ([facility])    [address]  [facility] [address] [phone]           Please bring any scans or X-rays taken at other hospitals, if similar tests were done. Also bring a list of your medicines, including vitamins, minerals and over-the-counter drugs. It is safest to leave personal items at home.  Be sure to tell your doctor:   If you have any allergies.   If there s any chance you are pregnant.   If you are breastfeeding.   If you have any special needs.  You may have contrast for this exam. To prepare:   Do not eat or drink for 2 hours before your exam. If you need to take medicine, you may take it with small sips of water. (We may ask you to take liquid medicine as well.)   The day before your exam, drink extra fluids at least six 8-ounce glasses (unless your doctor tells you to restrict your fluids).  Patients over 70 or patients with  diabetes or kidney problems:   If you haven t had a blood test (creatinine test) within the last 30 days, go to your clinic or Diagnostic Imaging Department for this test.  If you have diabetes:   If your kidney function is normal, continue taking your metformin (Avandamet, Glucophage, Glucovance, Metaglip) on the day of your exam.   If your kidney function is abnormal, wait 48 hours before restarting this medicine.  You will have oral contrast for this exam:   You will drink the contrast at home. Get this from your clinic or Diagnostic Imaging Department. Please follow the directions given.  Please wear loose clothing, such as a sweat suit or jogging clothes. Avoid snaps, zippers and other metal. We may ask you to undress and put on a hospital gown.  If you have any questions, please call the Imaging Department where you will have your exam.            Humberto 10, 2018  8:15 AM CST   Cystoscopy with Isac Jett MD, UB CYF   [facility] [place] ([facility])    [address]  [address]  [facility] [address] [phone]              Future tests that were ordered for you today     Open Future Orders        Priority Expected Expires Ordered    CT Abdomen Pelvis w Contrast [YCL026] Routine  1/2/2019 1/2/2018            Who to contact     If you have questions or need follow up information about today's clinic visit or your schedule please contact [facility] [place] directly at [phone].  Normal or non-critical lab and imaging results will be communicated to you by MyChart, letter or phone within 4 business days after the clinic has received the results. If you do not hear from us within 7 days, please contact the clinic through MyChart or phone. If you have a critical or abnormal lab result, we will notify you by phone as soon as possible.  Submit refill requests through [employer] or call your pharmacy and  "they will forward the refill request to us. Please allow 3 business days for your refill to be completed.          Additional Information About Your Visit        [facility] Information     [employer] lets you send messages to your doctor, view your test results, renew your prescriptions, schedule appointments and more. To sign up, go to [email or web address] . Click on \"Log in\" on the left side of the screen, which will take you to the Welcome page. Then click on \"Sign up Now\" on the right side of the page.     You will be asked to enter the access code listed below, as well as some personal information. Please follow the directions to create your username and password.     Your access code is: [email or web address]  Expires: 3/20/2018  9:04 AM     Your access code will  in 90 days. If you need help or a new code, please call your [place] clinic or [phone].        Care EveryWhere ID     This is your Care EveryWhere ID. This could be used by other organizations to access your [place] medical records  [id number]        Your Vitals Were     Pulse Height BMI (Body Mass Index)             78 1.676 m (5' 6\") 37.12 kg/m2          Blood Pressure from Last 3 Encounters:   18 122/80   17 138/82   17 132/84    Weight from Last 3 Encounters:   18 104.3 kg (230 lb)   17 108.7 kg (239 lb 9.6 oz)   17 108.9 kg (240 lb)              We Performed the Following     Cytology non gyn          Today's Medication Changes          These changes are accurate as of: 18  9:50 AM.  If you have any questions, ask your nurse or doctor.               Start taking these medicines.        Dose/Directions    tamsulosin 0.4 MG capsule   Commonly known as:  FLOMAX   Used for:  Microscopic hematuria   Started by:  Dorys Rodriguez PA-C        Dose:  0.4 mg   Take 1 capsule (0.4 mg) by mouth daily   Quantity:  90 capsule   Refills:  3            Where to get your medicines      These medications were sent to " [facility] - [place], [place] - [address]  [address], [facility] [address]     Phone:  [phone]     tamsulosin 0.4 MG capsule                Primary Care Provider Fax #    Physician No Ref-Primary [phone]       No address on file        Equal Access to Services     MARIA SETHER [facility] : Hadii aad ku hadasho Soomaali, waaxda luqadaha, qaybta kaalmada adeegyada, waxay idiin hayaan adeeg kharash ladeandren ah. So [facility] [phone].    ATENCIÓN: Si habla español, tiene a henry disposición servicios gratuitos de asistencia lingüística. Parveen al [phone].    We comply with applicable federal civil rights laws and [place] laws. We do not discriminate on the basis of race, color, national origin, age, disability, sex, sexual orientation, or gender identity.            Thank you!     Thank you for choosing [facility] [place]  for your care. Our goal is always to provide you with excellent care. Hearing back from our patients is one way we can continue to improve our services. Please take a few minutes to complete the written survey that you may receive in the mail after your visit with us. Thank you!             Your Updated Medication List - Protect others around you: Learn how to safely use, store and throw away your medicines at www.disposemymeds.org.          This list is accurate as of: 1/2/18  9:50 AM.  Always use your most recent med list.                   Brand Name Dispense Instructions for use Diagnosis    ibuprofen 200 MG tablet    ADVIL/MOTRIN    60 tablet    Take 3 tablets (600 mg) by mouth every 6 hours as needed for mild pain or fever        tamsulosin 0.4 MG capsule    FLOMAX    90 capsule    Take 1 capsule (0.4 mg) by mouth daily    Microscopic hematuria

## 2018-01-03 LAB — COPATH REPORT: NORMAL

## 2018-01-10 ENCOUNTER — HOSPITAL ENCOUNTER (OUTPATIENT)
Dept: CT IMAGING | Facility: CLINIC | Age: 48
Discharge: HOME | End: 2018-01-10
Attending: PHYSICIAN ASSISTANT | Admitting: PHYSICIAN ASSISTANT
Payer: COMMERCIAL

## 2018-01-10 PROCEDURE — 74178 CT ABD&PLV WO CNTR FLWD CNTR: CPT

## 2018-01-15 ENCOUNTER — HOSPITAL ENCOUNTER (EMERGENCY)
Facility: CLINIC | Age: 48
Discharge: HOME | End: 2018-01-15
Attending: EMERGENCY MEDICINE | Admitting: EMERGENCY MEDICINE
Payer: COMMERCIAL

## 2018-01-15 ENCOUNTER — HOSPITAL ENCOUNTER (EMERGENCY)
Facility: CLINIC | Age: 48
Discharge: HOME | End: 2018-01-15
Attending: EMERGENCY MEDICINE
Payer: COMMERCIAL

## 2018-01-15 VITALS
OXYGEN SATURATION: 98 % | SYSTOLIC BLOOD PRESSURE: 133 MMHG | TEMPERATURE: 97.7 F | RESPIRATION RATE: 16 BRPM | DIASTOLIC BLOOD PRESSURE: 88 MMHG | HEART RATE: 74 BPM

## 2018-01-15 VITALS
SYSTOLIC BLOOD PRESSURE: 132 MMHG | OXYGEN SATURATION: 97 % | RESPIRATION RATE: 17 BRPM | HEART RATE: 74 BPM | TEMPERATURE: 97.7 F | DIASTOLIC BLOOD PRESSURE: 87 MMHG

## 2018-01-15 DIAGNOSIS — R04.0 EPISTAXIS: ICD-10-CM

## 2018-01-15 DIAGNOSIS — R00.2 PALPITATIONS: ICD-10-CM

## 2018-01-15 LAB
ANION GAP SERPL CALCULATED.3IONS-SCNC: 8 MMOL/L (ref 3–14)
BASOPHILS # BLD AUTO: 0 10E9/L (ref 0–0.2)
BASOPHILS NFR BLD AUTO: 0.7 %
BUN SERPL-MCNC: 14 MG/DL (ref 7–30)
CALCIUM SERPL-MCNC: 8.5 MG/DL (ref 8.5–10.1)
CHLORIDE SERPL-SCNC: 106 MMOL/L (ref 94–109)
CO2 SERPL-SCNC: 25 MMOL/L (ref 20–32)
CREAT SERPL-MCNC: 0.76 MG/DL (ref 0.66–1.25)
DIFFERENTIAL METHOD BLD: NORMAL
EOSINOPHIL # BLD AUTO: 0.1 10E9/L (ref 0–0.7)
EOSINOPHIL NFR BLD AUTO: 1.6 %
ERYTHROCYTE [DISTWIDTH] IN BLOOD BY AUTOMATED COUNT: 13.4 % (ref 10–15)
GFR SERPL CREATININE-BSD FRML MDRD: >90 ML/MIN/1.7M2
GLUCOSE SERPL-MCNC: 93 MG/DL (ref 70–99)
HCT VFR BLD AUTO: 44.9 % (ref 40–53)
HGB BLD-MCNC: 15.6 G/DL (ref 13.3–17.7)
IMM GRANULOCYTES # BLD: 0 10E9/L (ref 0–0.4)
IMM GRANULOCYTES NFR BLD: 0.5 %
INTERPRETATION ECG - MUSE: NORMAL
LYMPHOCYTES # BLD AUTO: 1.4 10E9/L (ref 0.8–5.3)
LYMPHOCYTES NFR BLD AUTO: 23.8 %
MCH RBC QN AUTO: 29.5 PG (ref 26.5–33)
MCHC RBC AUTO-ENTMCNC: 34.7 G/DL (ref 31.5–36.5)
MCV RBC AUTO: 85 FL (ref 78–100)
MONOCYTES # BLD AUTO: 0.5 10E9/L (ref 0–1.3)
MONOCYTES NFR BLD AUTO: 8 %
NEUTROPHILS # BLD AUTO: 3.8 10E9/L (ref 1.6–8.3)
NEUTROPHILS NFR BLD AUTO: 65.4 %
NRBC # BLD AUTO: 0 10*3/UL
NRBC BLD AUTO-RTO: 0 /100
PLATELET # BLD AUTO: 227 10E9/L (ref 150–450)
POTASSIUM SERPL-SCNC: 3.9 MMOL/L (ref 3.4–5.3)
RBC # BLD AUTO: 5.28 10E12/L (ref 4.4–5.9)
SODIUM SERPL-SCNC: 139 MMOL/L (ref 133–144)
TSH SERPL DL<=0.005 MIU/L-ACNC: 1.23 MU/L (ref 0.4–4)
WBC # BLD AUTO: 5.8 10E9/L (ref 4–11)

## 2018-01-15 PROCEDURE — 99284 EMERGENCY DEPT VISIT MOD MDM: CPT | Mod: 25

## 2018-01-15 PROCEDURE — 85025 COMPLETE CBC W/AUTO DIFF WBC: CPT | Performed by: EMERGENCY MEDICINE

## 2018-01-15 PROCEDURE — 93005 ELECTROCARDIOGRAM TRACING: CPT

## 2018-01-15 PROCEDURE — 84443 ASSAY THYROID STIM HORMONE: CPT | Performed by: EMERGENCY MEDICINE

## 2018-01-15 PROCEDURE — 80048 BASIC METABOLIC PNL TOTAL CA: CPT | Performed by: EMERGENCY MEDICINE

## 2018-01-15 RX ORDER — LIDOCAINE HYDROCHLORIDE 40 MG/ML
INJECTION, SOLUTION RETROBULBAR
Status: DISCONTINUED
Start: 2018-01-15 | End: 2018-01-16 | Stop reason: HOSPADM

## 2018-01-15 RX ORDER — OXYCODONE AND ACETAMINOPHEN 5; 325 MG/1; MG/1
1-2 TABLET ORAL EVERY 4 HOURS PRN
Qty: 15 TABLET | Refills: 0 | Status: SHIPPED | OUTPATIENT
Start: 2018-01-15 | End: 2018-01-25

## 2018-01-15 RX ORDER — LIDOCAINE HYDROCHLORIDE 40 MG/ML
5 INJECTION, SOLUTION RETROBULBAR ONCE
Status: DISCONTINUED | OUTPATIENT
Start: 2018-01-15 | End: 2018-01-16 | Stop reason: HOSPADM

## 2018-01-15 RX ORDER — OXYMETAZOLINE HYDROCHLORIDE 0.05 G/100ML
SPRAY NASAL
Status: DISCONTINUED
Start: 2018-01-15 | End: 2018-01-16 | Stop reason: HOSPADM

## 2018-01-15 ASSESSMENT — ENCOUNTER SYMPTOMS
DIZZINESS: 0
PALPITATIONS: 1
HEADACHES: 1
HEADACHES: 0
LIGHT-HEADEDNESS: 0

## 2018-01-15 NOTE — ED AVS SNAPSHOT
[facility] Emergency Department    [address]    [facility] [address]    Phone:  [phone]    Fax:  [phone]                                       Parth Valdez   MRN: [id number]    Department:  [facility] Emergency Department   Date of Visit:  1/15/2018           Patient Information     Date Of Birth          1970        Your diagnoses for this visit were:     Epistaxis     Palpitations        You were seen by Jamila Conteh MD.      Follow-up Information     Follow up with Your primary care clinic.    Why:  Recheck of your blood pressure        Discharge Instructions               Sangrado de la nariz (Adulto)     Comúnmente, el sangrado de la nariz (hemorragia nasal) se produce debido a zuhair herida o a que se seca y se agrieta el recubrimiento interno de la nariz. La mayoría de joaquín tipo de sangrados se deben al aire seco o a hurgarse la nariz. Pueden presentarse suzanne un resfriado común o suzanne un ataque de alergia. También pueden presentarse un día de mucho calor o debido al aire seco del invierno.  Si se encuentra el lugar del sangrado, se puede cauterizar (tratar para provocar la formación de un coágulo de gema). [place] se puede hacer con zuhair sustancia química, con calor o con electricidad. Si continúa el sangrado después de la cauterización, o si el sitio de sangrado no se puede encontrar, es posible que se le coloque zuhair compresa en henry nariz. [place] se hace para aplicar presión y detener el sangrado. Caleb compresa puede estar hecha de gasa o esponja. En ocasiones, se usa zuhair sonda (tubo pequeño) con un pequeño globo. Es henry médico quien debe quitar la compresa o la sonda. Hay también algunos tipos de compresa que se disuelven solas.   Cuidados en henry casa    Si le colocaron zuhair compresa en la nariz, no jale de esta ni intente quitarla usted mismo, a menos que le hayan indicado lo contrario. Le darán zuhair katheryn para quitársela. También es posible  que le receten antibióticos para evitar zuhair infección en los senos paranasales. De ser así, termine todo el medicamento.    Zuhair vez que se haya detenido el sangrado, no se suene la nariz suzanne las 12 horas siguientes. Eso permitirá que se forme un coágulo doris de gema. No hurgue en henry nariz, porque esto puede provocar que vuelva a sangrar.    Evite beber alcohol y líquidos calientes suzanne los dos lauren siguientes. La presencia de estas bebidas en henry boca puede dilatar los vasos sanguíneos de henry nariz. A henry vez, esto puede provocar que se reinicie el sangrado.    No tome ibuprofeno, naproxeno ni medicamentos que contengan aspirina. Estos diluyen la gema y pueden estimular el sangrado de la nariz. A menos que le hayan recetado otro calmante (analgésico), sí puede halima paracetamol (acetaminofén) para el dolor.    Si la hemorragia vuelve a comenzar, siéntese e inclínese hacia adelante para evitar tragar la gema. [employer] henry nariz con firmeza de los dos lados, yunior se ve más arriba suzanne 10 a 15 minutos. Tómese el tiempo y no libere la presión sobre henry nariz hasta que hayan pasado los 10 minutos. Si no se controla el sangrado, siga apretando henry nariz y llame a henry médico o regrese a joaquín centro.    Si tiene un resfriado, alergia o janet membranas nasales están secas, lubrique las fosas nasales. Aplique zuhair cantidad pequeña de vaselina dentro de la nariz con un hisopo de algodón dos veces al día (a la mañana y a la noche).    Evite calentar demasiado el ambiente en henry casa, porque esto puede secar el aire y empeorar henry afección.    Usar un humidificador en la habitación en donde usted duerme le dará humedad al aire.    Use un espray nasal salino para mantener húmedas las fosas nasales.    No hurgue en henry nariz. Y mantenga las uñas cortas para reducir el riesgo de sangrado.    No fume.  Visitas de control  Programe zuhair visita de control con henry proveedor de atención médica o según se le haya indicado. La compresa  nasal se debe revisar o quitar a los dos o dirk días después de kartik sido colocada.   Cuándo debe buscar atención médica   Llame enseguida a henry proveedor de atención médica si tiene cualquiera de los siguientes síntomas.    Otra hemorragia que no puede controlar    Mareo, debilidad o desmayo    Cansancio o confusión    Fiebre de 100.4  F (38  C) o más, o según le haya indicado henry proveedor de atención médica    Dolor de crystal    Dolor en los senos paranasales o la jennyfer    Falta de aire o dificultades para respirar   Date Last Reviewed: 3/22/2015    3043-5902 [employer]. [address] [place] [address]. Todos los derechos reservados. Esta información no pretende sustituir la atención médica profesional. Sólo henry médico puede diagnosticar y tratar un problema de forest.        Nosebleed (Adult)    Bleeding from the nose most commonly occurs because of injury or drying and cracking of the inner lining of the nose. Most nosebleeds are because of dry air or nose-picking. They can occur during a common cold or an allergy attack. They can also occur on a very hot day, or from dry air in the winter.  If the bleeding site is found, it may be cauterized. This means it is treated to cause a blood clot to form. This may be done with a chemical, heat, or electricity. If the bleeding continues after the site is cauterized, or if the site cannot be found, packing may be put in your nose. This is to apply pressure and stop the bleeding. The packing may be made of gauze or sponge. A small balloon catheter is sometimes used. These must be removed by your doctor. Some types of packing dissolve on their own.  Home care    If packing was put in your nose, unless told otherwise, do not pull on it or try to remove it yourself. You will be given an appointment to have it removed. You may also have been given antibiotics to prevent a sinus infection. If so, finish all of the medicine.    Do not blow your nose for 12  hours after the bleeding stops. This will allow a strong blood clot to form. Do not pick your nose. This may restart bleeding.    Avoid drinking alcohol and hot liquids for the next 2 days. Alcohol or hot liquids in your mouth can dilate blood vessels in your nose. This can cause bleeding to start again.    Do not take ibuprofen, naproxen, or medicines that contain aspirin. These thin the blood and may cause your nose to bleed. You may take acetaminophen for pain, unless another pain medicine was prescribed.    If the bleeding starts again, sit up and lean forward to prevent swallowing blood. Pinch your nose tightly on both sides, as shown above, for 10 to 15 minutes. Time yourself. Don t release the pressure on your nose until 10 minutes is up. If bleeding does not stop, continue to pinch your nose and call your healthcare provider or return to this facility.    If you have a cold, allergies, or dry nasal membranes, lubricate the nasal passages. Apply a small amount of petroleum jelly inside the nose with a cotton swab twice a day (morning and night).    Avoid overheating your home. This can dry the air and make your condition worse.    Put a humidifier in the room where you sleep. This will add moisture to the air.    Use a saline nasal spray to keep nasal passages moist.    Do not pick your nose. Keep fingernails trimmed to decrease risk of bleeds.    Do not smoke.  Follow-up care  Follow up with your healthcare provider, or as advised. Nasal packing should be rechecked or removed within 2 to 3 days.  When to seek medical advice  Call your healthcare provider right away if any of these occur.    You have another nosebleed that you cannot control    Dizziness, weakness, or fainting    You become tired or confused    Fever of 100.4 F (38 C) or higher, or as directed by your healthcare provider    Headache    Sinus or facial pain    Shortness of breath or trouble breathing  Date Last Reviewed: 3/22/2015    [id number]  "[employer]. [address] [place] [address], [place] [address]. All rights reserved. This information is not intended as a substitute for professional medical care. Always follow your healthcare professional's instructions.        Palpitaciones Cardíacas [Heart Palpitations]    Las palpitaciones (palpitations) se refieren a la sensación de que el corazón late con rapidez, doris o de manera irregular. Algunas personas dicen que sienten que \"se les va a salir el corazón\" o que \"el corazón parece saltarles en el pecho\" (\"pounding\", \"skipped beats\"). Las palpitaciones pueden presentarse en personas con alguna enfermedad del corazón, phong también pueden darse en personas sanas.  Causas Relacionadas Con El Corazón:     Arritmia (arrhythmia): un cambio en el ritmo normal del corazón.    Enfermedad de las válvulas del corazón.  Causas No Relacionadas Con El Corazón:     Ciertos medicamentos (tales yunior los inhaladores para tratar el asma [asthma inhalers] y los descongestionantes [decongestants]).    Algunos suplementos herbáceos, pastillas y bebidas energizantes, y pastillas para adelgazar.    Las drogas ilegales estimulantes (tales yunior la cocaína [cocaine], el crack, las metanfetaminas [methamphetamine], el PCP).    La cafeína, el alcohol y el tabaco.    Algunas afecciones médicas, tales yunior enfermedad de la glándula tiroides (thyroid disease), anemia, ataques de pánico y ansiedad (anxiety/panic disorder).  En ocasiones, no se logra encontrar la causa que las genera.  Cuidados En La [place]:  1. Evite el exceso de cafeína, alcohol, tabaco y toda droga estimulante.  2. Coméntele a henry médico sobre todos los medicamentos recetados, de venta sin receta o medicamentos herbáceos que usted tome.  Programe zuhair VISITA DE CONTROL con henry médico, o según le indique nuestro personal médico.  Obtenga Prontamente Atención Médica  si se presenta cualquiera de lo siguiente con palpitaciones:    Debilidad, mareo, aturdimiento o " "desmayo.    Dolor en el pecho o falta de aire.    El corazón le late con rapidez (más de 120 latidos por minuto, en descanso).    Palpitaciones que perez más de 20 minutos.    Debilidad en un brazo o zuhair pierna, o en un lado de la jennyfer.    Dificultades para hablar o horacio.  Date Last Reviewed: 4/27/2016 2000-2017 [employer]. [address] [place] [address]. Todos los derechos reservados. Esta información no pretende sustituir la atención médica profesional. Sólo henry médico puede diagnosticar y tratar un problema de forest.        * Heart Palpitations    Palpitations refers to the feeling that your heart is beating hard, fast or irregular. Some people describe it as \"pounding\" or \"skipped beats\". Palpitations may occur in persons with heart disease, but can also occur in healthy persons. Your doctor does not believe that anything dangerous is causing your symptoms at this time.  Heart-Related Causes:    Arrhythmia (a change from the heart's normal rhythm)    Disease of the heart valves  Non-Heart-Related Causes:    Certain medicines (such as asthma inhalers and decongestants)    Some herbal supplements, energy drinks and pills, and weight loss pills    Illegal stimulant drugs (such as cocaine, crank, methamphetamine, PCP)    Caffeine, alcohol and tobacco    Medical conditions such as thyroid disease, anemia, anxiety and panic disorder  Sometimes the cause cannot be found.  Home Care:  3. Avoid excess caffeine, alcohol, tobacco and any stimulant drugs.  4. Tell your doctor about any prescription or over-the-counter or herbal medicines you take.  Follow Up  with your doctor or as advised by our staff.  Get Prompt Medical Attention  if any of the following occur together with palpitations:    Weakness, dizziness, light-headed or fainting    Chest pain or shortness of breath    Rapid heart rate (over 120 beats per minute, at rest)    Palpitations that lasts over 20 minutes    Weakness of an arm or " leg or one side of the face    Difficulty with speech or vision    1824-7192 [employer]. [address] [place] [address], [place] [address]. All rights reserved. This information is not intended as a substitute for professional medical care. Always follow your healthcare professional's instructions.  This information has been modified by your health care provider with permission from the publisher.            Future Appointments        Provider Department Dept Phone Center    1/25/2018 9:45 AM Lorna Storm MD [facility] [phone] [facility]      24 Hour Appointment Hotline       To make an appointment at any [facility], call [phone] ([phone]). If you don't have a family doctor or clinic, we will help you find one. [facility] clinics are conveniently located to serve the needs of you and your family.             Review of your medicines      Our records show that you are taking the medicines listed below. If these are incorrect, please call your family doctor or clinic.        Dose / Directions Last dose taken    ibuprofen 200 MG tablet   Commonly known as:  ADVIL/MOTRIN   Dose:  600 mg   Quantity:  60 tablet        Take 3 tablets (600 mg) by mouth every 6 hours as needed for mild pain or fever   Refills:  0        tamsulosin 0.4 MG capsule   Commonly known as:  FLOMAX   Dose:  0.4 mg   Quantity:  90 capsule        Take 1 capsule (0.4 mg) by mouth daily   Refills:  3                Procedures and tests performed during your visit     Basic metabolic panel    CBC with platelets differential    Cardiac Continuous Monitoring    EKG 12 lead    TSH with free T4 reflex      Orders Needing Specimen Collection     None      Pending Results     No orders found from 1/13/2018 to 1/16/2018.            Pending Culture Results     No orders found from 1/13/2018 to 1/16/2018.            Pending Results Instructions     If you had any lab results that  were not finalized at the time of your Discharge, you can call the ED Lab Result RN at [phone]. You will be contacted by this team for any positive Lab results or changes in treatment. The nurses are available 7 days a week from 10A to 6:30P.  You can leave a message 24 hours per day and they will return your call.        Test Results From Your Hospital Stay        1/15/2018  9:02 AM      Component Results     Component Value Ref Range & Units Status    WBC 5.8 4.0 - 11.0 10e9/L Final    RBC Count 5.28 4.4 - 5.9 10e12/L Final    Hemoglobin 15.6 13.3 - 17.7 g/dL Final    Hematocrit 44.9 40.0 - 53.0 % Final    MCV 85 78 - 100 fl Final    MCH 29.5 26.5 - 33.0 pg Final    MCHC 34.7 31.5 - 36.5 g/dL Final    RDW 13.4 10.0 - 15.0 % Final    Platelet Count 227 150 - 450 10e9/L Final    Diff Method Automated Method  Final    % Neutrophils 65.4 % Final    % Lymphocytes 23.8 % Final    % Monocytes 8.0 % Final    % Eosinophils 1.6 % Final    % Basophils 0.7 % Final    % Immature Granulocytes 0.5 % Final    Nucleated RBCs 0 0 /100 Final    Absolute Neutrophil 3.8 1.6 - 8.3 10e9/L Final    Absolute Lymphocytes 1.4 0.8 - 5.3 10e9/L Final    Absolute Monocytes 0.5 0.0 - 1.3 10e9/L Final    Absolute Eosinophils 0.1 0.0 - 0.7 10e9/L Final    Absolute Basophils 0.0 0.0 - 0.2 10e9/L Final    Abs Immature Granulocytes 0.0 0 - 0.4 10e9/L Final    Absolute Nucleated RBC 0.0  Final         1/15/2018  9:24 AM      Component Results     Component Value Ref Range & Units Status    Sodium 139 133 - 144 mmol/L Final    Potassium 3.9 3.4 - 5.3 mmol/L Final    Chloride 106 94 - 109 mmol/L Final    Carbon Dioxide 25 20 - 32 mmol/L Final    Anion Gap 8 3 - 14 mmol/L Final    Glucose 93 70 - 99 mg/dL Final    Urea Nitrogen 14 7 - 30 mg/dL Final    Creatinine 0.76 0.66 - 1.25 mg/dL Final    GFR Estimate >90 >60 mL/min/1.7m2 Final    Non  GFR Calc    GFR Estimate If Black >90 >60 mL/min/1.7m2 Final    African American GFR Calc     Calcium 8.5 8.5 - 10.1 mg/dL Final         1/15/2018  9:30 AM      Component Results     Component Value Ref Range & Units Status    TSH 1.23 0.40 - 4.00 mU/L Final                Clinical Quality Measure: Blood Pressure Screening     Your blood pressure was checked while you were in the emergency department today. The last reading we obtained was  BP: 138/85 . Please read the guidelines below about what these numbers mean and what you should do about them.  If your systolic blood pressure (the top number) is less than 120 and your diastolic blood pressure (the bottom number) is less than 80, then your blood pressure is normal. There is nothing more that you need to do about it.  If your systolic blood pressure (the top number) is 120-139 or your diastolic blood pressure (the bottom number) is 80-89, your blood pressure may be higher than it should be. You should have your blood pressure rechecked within a year by a primary care provider.  If your systolic blood pressure (the top number) is 140 or greater or your diastolic blood pressure (the bottom number) is 90 or greater, you may have high blood pressure. High blood pressure is treatable, but if left untreated over time it can put you at risk for heart attack, stroke, or kidney failure. You should have your blood pressure rechecked by a primary care provider within the next 4 weeks.  If your provider in the emergency department today gave you specific instructions to follow-up with your doctor or provider even sooner than that, you should follow that instruction and not wait for up to 4 weeks for your follow-up visit.        Thank you for choosing [facility]       Thank you for choosing [facility] for your care. Our goal is always to provide you with excellent care. Hearing back from our patients is one way we can continue to improve our services. Please take a few minutes to complete the written survey that you may receive in the mail after you visit with us. Thank  "you!        [employer] Information     [employer] lets you send messages to your doctor, view your test results, renew your prescriptions, schedule appointments and more. To sign up, go to [email or web address] . Click on \"Log in\" on the left side of the screen, which will take you to the Welcome page. Then click on \"Sign up Now\" on the right side of the page.     You will be asked to enter the access code listed below, as well as some personal information. Please follow the directions to create your username and password.     Your access code is: [email or web address]  Expires: 3/20/2018  9:04 AM     Your access code will  in 90 days. If you need help or a new code, please call your [facility] clinic or [phone].        Care EveryWhere ID     This is your Care EveryWhere ID. This could be used by other organizations to access your [facility] medical records  [id number]        Equal Access to Services     BEV VICTORIA : Hadii aad ku hadasho Sosoham, waaxda luqadaha, qaybta kaalmada adeegyada, grant pantoja . So [facility] [phone].    ATENCIÓN: Si habla español, tiene a henry disposición servicios gratuitos de asistencia lingüística. Llame al [phone].    We comply with applicable federal civil rights laws and [place] laws. We do not discriminate on the basis of race, color, national origin, age, disability, sex, sexual orientation, or gender identity.            After Visit Summary       This is your record. Keep this with you and show to your community pharmacist(s) and doctor(s) at your next visit.                  "

## 2018-01-15 NOTE — ED AVS SNAPSHOT
" [facility]    [address]    [facility] [address]    Phone:  [phone]    Fax:  [phone]                                       Parth Valdez   MRN: [id number]    Department:  [facility] Emergency Department   Date of Visit:  1/15/2018           Patient Information     Date Of Birth          1970        Your diagnoses for this visit were:     Epistaxis        You were seen by Giovanny Moser MD.      Follow-up Information     Follow up with [facility].    Specialty:  EMERGENCY MEDICINE    Why:  If symptoms worsen, or nose bleed returns    Contact information:    [address]  [facility] [address] [phone]        Follow up with [facility]. Schedule an appointment as soon as possible for a visit in 3 days.    Why:  For recheck of right nose bleed    Contact information:    [phone]        Discharge Instructions       Discharge Instructions  Epistaxis    Today you were seen for a nosebleed.   Nosebleeds (the medical term is \"epistaxis\") are very common. Almost every person has had at least one in their lifetime.  Although the amount of blood loss can appear dramatic, nosebleeds rarely cause serious problems.  The most common causes are dry air or nose picking, but they also are common in people who have allergies, high blood pressure, or are on blood thinners (such as Coumadin, Aspirin or Plavix). If you or your child gets a nosebleed, the important thing is to know how to take care of it. With the right self-care, most nosebleeds will stop on their own.    Generally, every Emergency Department visit should have a follow-up clinic visit with either a primary or a specialty clinic/provider. Please follow-up as instructed by your emergency provider today.    Return to the Emergency Department if:    Your nose is bleeding a very large amount of blood and you are unable to " stop it.    You get very pale, faint, or tired.    You cannot get the bleeding to stop after following these instructions.    Treatment:    Your provider may tell you to use a decongestant nose spray, like Afrin  (oxymetazoline), in both nostrils in the morning and at night for the next three days. Do not use this medicine for more than three days at a time.  If you do, it will cause nasal congestion.     Use a moisturizer. A small amount of Vaseline  to the inside of your nostrils for moisture before bed is one option. There are nasal sprays available over-the-counter for this purpose as well.  Using a humidifier in your bedroom or home will help as well when the air is dry.    For the next three days, do not blow your nose or put anything in your nose. You may sniffle, or dab the outside of your nose.    Do not bend with your head below your waist for the next three days. Do not lift anything so heavy that you have to strain.     If you received nasal packing, please do not remove the packing until seen by an Ear, Nose, and Throat (ENT) specialist.  If antibiotics have been given with the packing, please take as directed.    If your nose starts to bleed again:    Blow your nose to get rid of some of the clots that have formed inside your nostrils. This may increase the bleeding temporarily, but that is okay.    Spray decongestant nose spray (like Afrin ) into both nostrils to constrict the blood vessels.    Sit or stand while bending forward slightly at the waist. Do not lie down or tilt your head back. This may cause you to swallow blood and can lead to vomiting.     the soft part of BOTH nostrils at the bottom of your nose and squeeze your nose closed for at least 5 minutes (for children) or 10 to 15 minutes (for adults). Use a clock to time yourself. Do not release the pressure every so often to check whether the bleeding has stopped. Many people hurt their chances of stopping the bleeding by releasing the  pressure too soon or too often.    If you follow the steps outlined above, and your nose continues to bleed, repeat all the steps once more. Apply pressure for a total of at least 30 minutes. If you continue to bleed even then, seek medical attention.  If you were given a prescription for medicine here today, be sure to read all of the information (including the package insert) that comes with your prescription.  This will include important information about the medicine, its side effects, and any warnings that you need to know about.  The pharmacist who fills the prescription can provide more information and answer questions you may have about the medicine.  If you have questions or concerns that the pharmacist cannot address, please call or return to the Emergency Department.   Remember that you can always come back to the Emergency Department if you are not able to see your regular provider in the amount of time listed above, if you get any new symptoms, or if there is anything that worries you.      Future Appointments        Provider Department Dept Phone Center    1/25/2018 9:45 AM Lorna Storm MD [facility] [phone] [facility]      24 Hour Appointment Hotline       To make an appointment at any [facility], call [phone] ([phone]). If you don't have a family doctor or clinic, we will help you find one. [facility] are conveniently located to serve the needs of you and your family.             Review of your medicines      START taking        Dose / Directions Last dose taken    amoxicillin-clavulanate 875-125 MG per tablet   Commonly known as:  AUGMENTIN   Dose:  1 tablet   Quantity:  14 tablet        Take 1 tablet by mouth 2 times daily for 7 days   Refills:  0        oxyCODONE-acetaminophen 5-325 MG per tablet   Commonly known as:  PERCOCET   Dose:  1-2 tablet   Quantity:  15 tablet        Take 1-2 tablets by mouth every 4  hours as needed for pain   Refills:  0          Our records show that you are taking the medicines listed below. If these are incorrect, please call your family doctor or clinic.        Dose / Directions Last dose taken    ibuprofen 200 MG tablet   Commonly known as:  ADVIL/MOTRIN   Dose:  600 mg   Quantity:  60 tablet        Take 3 tablets (600 mg) by mouth every 6 hours as needed for mild pain or fever   Refills:  0        tamsulosin 0.4 MG capsule   Commonly known as:  FLOMAX   Dose:  0.4 mg   Quantity:  90 capsule        Take 1 capsule (0.4 mg) by mouth daily   Refills:  3                Prescriptions were sent or printed at these locations (2 Prescriptions)                   Other Prescriptions                Printed at Department/Unit printer (2 of 2)         amoxicillin-clavulanate (AUGMENTIN) 875-125 MG per tablet               oxyCODONE-acetaminophen (PERCOCET) 5-325 MG per tablet                Orders Needing Specimen Collection     None      Pending Results     No orders found from 1/13/2018 to 1/16/2018.            Pending Culture Results     No orders found from 1/13/2018 to 1/16/2018.            Pending Results Instructions     If you had any lab results that were not finalized at the time of your Discharge, you can call the ED Lab Result RN at [phone]. You will be contacted by this team for any positive Lab results or changes in treatment. The nurses are available 7 days a week from 10A to 6:30P.  You can leave a message 24 hours per day and they will return your call.        Test Results From Your Hospital Stay               Clinical Quality Measure: Blood Pressure Screening     Your blood pressure was checked while you were in the emergency department today. The last reading we obtained was  BP: 159/87 . Please read the guidelines below about what these numbers mean and what you should do about them.  If your systolic blood pressure (the top number) is less than 120 and your diastolic blood  "pressure (the bottom number) is less than 80, then your blood pressure is normal. There is nothing more that you need to do about it.  If your systolic blood pressure (the top number) is 120-139 or your diastolic blood pressure (the bottom number) is 80-89, your blood pressure may be higher than it should be. You should have your blood pressure rechecked within a year by a primary care provider.  If your systolic blood pressure (the top number) is 140 or greater or your diastolic blood pressure (the bottom number) is 90 or greater, you may have high blood pressure. High blood pressure is treatable, but if left untreated over time it can put you at risk for heart attack, stroke, or kidney failure. You should have your blood pressure rechecked by a primary care provider within the next 4 weeks.  If your provider in the emergency department today gave you specific instructions to follow-up with your doctor or provider even sooner than that, you should follow that instruction and not wait for up to 4 weeks for your follow-up visit.        Thank you for choosing [facility]       Thank you for choosing [facility] for your care. Our goal is always to provide you with excellent care. Hearing back from our patients is one way we can continue to improve our services. Please take a few minutes to complete the written survey that you may receive in the mail after you visit with us. Thank you!        [employer] Information     [employer] lets you send messages to your doctor, view your test results, renew your prescriptions, schedule appointments and more. To sign up, go to [email or web address] . Click on \"Log in\" on the left side of the screen, which will take you to the Welcome page. Then click on \"Sign up Now\" on the right side of the page.     You will be asked to enter the access code listed below, as well as some personal information. Please follow the directions to create your username and password.     Your access code is: " [email or web address]  Expires: 3/20/2018  9:04 AM     Your access code will  in 90 days. If you need help or a new code, please call your [place] clinic or [phone].        Care EveryWhere ID     This is your Care EveryWhere ID. This could be used by other organizations to access your [place] medical records  [id number]        Equal Access to Services     BEV VICTORIA : Hadii trinidad salmono Sosoham, waaxda luqadaha, qaybta kaalmada adeegyada, grant pantoja . So [facility] [phone].    ATENCIÓN: Si habla español, tiene a henry disposición servicios gratuitos de asistencia lingüística. Llame al [phone].    We comply with applicable federal civil rights laws and [place] laws. We do not discriminate on the basis of race, color, national origin, age, disability, sex, sexual orientation, or gender identity.            After Visit Summary       This is your record. Keep this with you and show to your community pharmacist(s) and doctor(s) at your next visit.

## 2018-01-15 NOTE — ED AVS SNAPSHOT
[facility] Emergency Department    [address]    [facility] [address]    Phone:  [phone]    Fax:  [phone]                                       Parth Valdez   MRN: [id number]    Department:  [facility] Emergency Department   Date of Visit:  1/15/2018           After Visit Summary Signature Page     I have received my discharge instructions, and my questions have been answered. I have discussed any challenges I see with this plan with the nurse or doctor.    ..........................................................................................................................................  Patient/Patient Representative Signature      ..........................................................................................................................................  Patient Representative Print Name and Relationship to Patient    ..................................................               ................................................  Date                                            Time    ..........................................................................................................................................  Reviewed by Signature/Title    ...................................................              ..............................................  Date                                                            Time

## 2018-01-15 NOTE — DISCHARGE INSTRUCTIONS
Sangrado de la nariz (Adulto)     Comúnmente, el sangrado de la nariz (hemorragia nasal) se produce debido a zuhair herida o a que se seca y se agrieta el recubrimiento interno de la nariz. La mayoría de joaquín tipo de sangrados se deben al aire seco o a hurgarse la nariz. Pueden presentarse suzanne un resfriado común o suzanne un ataque de alergia. También pueden presentarse un día de mucho calor o debido al aire seco del invierno.  Si se encuentra el lugar del sangrado, se puede cauterizar (tratar para provocar la formación de un coágulo de gema). [place] se puede hacer con zuhair sustancia química, con calor o con electricidad. Si continúa el sangrado después de la cauterización, o si el sitio de sangrado no se puede encontrar, es posible que se le coloque zuhair compresa en henry nariz. [place] se hace para aplicar presión y detener el sangrado. Caleb compresa puede estar hecha de gasa o esponja. En ocasiones, se usa zuhair sonda (tubo pequeño) con un pequeño globo. Es henry médico quien debe quitar la compresa o la sonda. Hay también algunos tipos de compresa que se disuelven solas.   Cuidados en henry casa    Si le colocaron zuhair compresa en la nariz, no jale de esta ni intente quitarla usted mismo, a menos que le hayan indicado lo contrario. Le darán zuhair katheryn para quitársela. También es posible que le receten antibióticos para evitar zuhair infección en los senos paranasales. De ser así, termine todo el medicamento.    Zuhair vez que se haya detenido el sangrado, no se suene la nariz suzanne las 12 horas siguientes. Eso permitirá que se forme un coágulo doris de gema. No hurgue en henry nariz, porque esto puede provocar que vuelva a sangrar.    Evite beber alcohol y líquidos calientes suzanne los dos lauren siguientes. La presencia de estas bebidas en henry boca puede dilatar los vasos sanguíneos de henry nariz. A henry vez, esto puede provocar que se reinicie el sangrado.    No tome ibuprofeno, naproxeno ni medicamentos que contengan aspirina.  Estos diluyen la gema y pueden estimular el sangrado de la nariz. A menos que le hayan recetado otro calmante (analgésico), sí puede halima paracetamol (acetaminofén) para el dolor.    Si la hemorragia vuelve a comenzar, siéntese e inclínese hacia adelante para evitar tragar la gema. [employer] henry nariz con firmeza de los dos lados, yunior se ve más arriba suzanne 10 a 15 minutos. Tómese el tiempo y no libere la presión sobre henry nariz hasta que hayan pasado los 10 minutos. Si no se controla el sangrado, siga apretando henry nariz y llame a henry médico o regrese a joaquín centro.    Si tiene un resfriado, alergia o janet membranas nasales están secas, lubrique las fosas nasales. Aplique zuhair cantidad pequeña de vaselina dentro de la nariz con un hisopo de algodón dos veces al día (a la mañana y a la noche).    Evite calentar demasiado el ambiente en henry casa, porque esto puede secar el aire y empeorar henry afección.    Usar un humidificador en la habitación en donde usted duerme le dará humedad al aire.    Use un espray nasal salino para mantener húmedas las fosas nasales.    No hurgue en henry nariz. Y mantenga las uñas cortas para reducir el riesgo de sangrado.    No fume.  Visitas de control  Programe zuhair visita de control con henry proveedor de atención médica o según se le haya indicado. La compresa nasal se debe revisar o quitar a los dos o dirk días después de kartik sido colocada.   Cuándo debe buscar atención médica   Llame enseguida a henry proveedor de atención médica si tiene cualquiera de los siguientes síntomas.    Otra hemorragia que no puede controlar    Mareo, debilidad o desmayo    Cansancio o confusión    Fiebre de 100.4  F (38  C) o más, o según le haya indicado henry proveedor de atención médica    Dolor de crystal    Dolor en los senos paranasales o la jennyfer    Falta de aire o dificultades para respirar   Date Last Reviewed: 3/22/2015    [id number] [employer]. [address] [place] [address], [place] [address]. Todos los  derechos reservados. Esta información no pretende sustituir la atención médica profesional. Sólo henry médico puede diagnosticar y tratar un problema de forest.        Nosebleed (Adult)    Bleeding from the nose most commonly occurs because of injury or drying and cracking of the inner lining of the nose. Most nosebleeds are because of dry air or nose-picking. They can occur during a common cold or an allergy attack. They can also occur on a very hot day, or from dry air in the winter.  If the bleeding site is found, it may be cauterized. This means it is treated to cause a blood clot to form. This may be done with a chemical, heat, or electricity. If the bleeding continues after the site is cauterized, or if the site cannot be found, packing may be put in your nose. This is to apply pressure and stop the bleeding. The packing may be made of gauze or sponge. A small balloon catheter is sometimes used. These must be removed by your doctor. Some types of packing dissolve on their own.  Home care    If packing was put in your nose, unless told otherwise, do not pull on it or try to remove it yourself. You will be given an appointment to have it removed. You may also have been given antibiotics to prevent a sinus infection. If so, finish all of the medicine.    Do not blow your nose for 12 hours after the bleeding stops. This will allow a strong blood clot to form. Do not pick your nose. This may restart bleeding.    Avoid drinking alcohol and hot liquids for the next 2 days. Alcohol or hot liquids in your mouth can dilate blood vessels in your nose. This can cause bleeding to start again.    Do not take ibuprofen, naproxen, or medicines that contain aspirin. These thin the blood and may cause your nose to bleed. You may take acetaminophen for pain, unless another pain medicine was prescribed.    If the bleeding starts again, sit up and lean forward to prevent swallowing blood. Pinch your nose tightly on both sides, as  "shown above, for 10 to 15 minutes. Time yourself. Don t release the pressure on your nose until 10 minutes is up. If bleeding does not stop, continue to pinch your nose and call your healthcare provider or return to this facility.    If you have a cold, allergies, or dry nasal membranes, lubricate the nasal passages. Apply a small amount of petroleum jelly inside the nose with a cotton swab twice a day (morning and night).    Avoid overheating your home. This can dry the air and make your condition worse.    Put a humidifier in the room where you sleep. This will add moisture to the air.    Use a saline nasal spray to keep nasal passages moist.    Do not pick your nose. Keep fingernails trimmed to decrease risk of bleeds.    Do not smoke.  Follow-up care  Follow up with your healthcare provider, or as advised. Nasal packing should be rechecked or removed within 2 to 3 days.  When to seek medical advice  Call your healthcare provider right away if any of these occur.    You have another nosebleed that you cannot control    Dizziness, weakness, or fainting    You become tired or confused    Fever of 100.4 F (38 C) or higher, or as directed by your healthcare provider    Headache    Sinus or facial pain    Shortness of breath or trouble breathing  Date Last Reviewed: 3/22/2015    2885-7061 [email or web address]. [address] [place] [address]. All rights reserved. This information is not intended as a substitute for professional medical care. Always follow your healthcare professional's instructions.        Palpitaciones Cardíacas [Heart Palpitations]    Las palpitaciones (palpitations) se refieren a la sensación de que el corazón late con rapidez, doris o de manera irregular. Algunas personas dicen que sienten que \"se les va a salir el corazón\" o que \"el corazón parece saltarles en el pecho\" (\"pounding\", \"skipped beats\"). Las palpitaciones pueden presentarse en personas con alguna enfermedad del " corazón, phong también pueden darse en personas sanas.  Causas Relacionadas Con El Corazón:     Arritmia (arrhythmia): un cambio en el ritmo normal del corazón.    Enfermedad de las válvulas del corazón.  Causas No Relacionadas Con El Corazón:     Ciertos medicamentos (tales yunior los inhaladores para tratar el asma [asthma inhalers] y los descongestionantes [decongestants]).    Algunos suplementos herbáceos, pastillas y bebidas energizantes, y pastillas para adelgazar.    Las drogas ilegales estimulantes (tales yunior la cocaína [cocaine], el crack, las metanfetaminas [methamphetamine], el PCP).    La cafeína, el alcohol y el tabaco.    Algunas afecciones médicas, tales yunior enfermedad de la glándula tiroides (thyroid disease), anemia, ataques de pánico y ansiedad (anxiety/panic disorder).  En ocasiones, no se logra encontrar la causa que las genera.  Cuidados En [place]:  1. Evite el exceso de cafeína, alcohol, tabaco y toda droga estimulante.  2. Coméntele a henry médico sobre todos los medicamentos recetados, de venta sin receta o medicamentos herbáceos que usted tome.  Programe zuhair VISITA DE CONTROL con henry médico, o según le indique nuestro personal médico.  Obtenga Prontamente Atención Médica  si se presenta cualquiera de lo siguiente con palpitaciones:    Debilidad, mareo, aturdimiento o desmayo.    Dolor en el pecho o falta de aire.    El corazón le late con rapidez (más de 120 latidos por minuto, en descanso).    Palpitaciones que perez más de 20 minutos.    Debilidad en un brazo o zuhair pierna, o en un lado de la jennyfer.    Dificultades para hablar o horacio.  Date Last Reviewed: 4/27/2016 2000-2017 [employer]. [address] [place] [address], [place] [address]. Todos los derechos reservados. Esta información no pretende sustituir la atención médica profesional. Sólo henry médico puede diagnosticar y tratar un problema de forest.        * Heart Palpitations    Palpitations refers to the feeling that your heart is  "beating hard, fast or irregular. Some people describe it as \"pounding\" or \"skipped beats\". Palpitations may occur in persons with heart disease, but can also occur in healthy persons. Your doctor does not believe that anything dangerous is causing your symptoms at this time.  Heart-Related Causes:    Arrhythmia (a change from the heart's normal rhythm)    Disease of the heart valves  Non-Heart-Related Causes:    Certain medicines (such as asthma inhalers and decongestants)    Some herbal supplements, energy drinks and pills, and weight loss pills    Illegal stimulant drugs (such as cocaine, crank, methamphetamine, PCP)    Caffeine, alcohol and tobacco    Medical conditions such as thyroid disease, anemia, anxiety and panic disorder  Sometimes the cause cannot be found.  Home Care:  3. Avoid excess caffeine, alcohol, tobacco and any stimulant drugs.  4. Tell your doctor about any prescription or over-the-counter or herbal medicines you take.  Follow Up  with your doctor or as advised by our staff.  Get Prompt Medical Attention  if any of the following occur together with palpitations:    Weakness, dizziness, light-headed or fainting    Chest pain or shortness of breath    Rapid heart rate (over 120 beats per minute, at rest)    Palpitations that lasts over 20 minutes    Weakness of an arm or leg or one side of the face    Difficulty with speech or vision    9206-8918 [employer]. [address] [place] [address], [place] [address]. All rights reserved. This information is not intended as a substitute for professional medical care. Always follow your healthcare professional's instructions.  This information has been modified by your health care provider with permission from the publisher.          "

## 2018-01-15 NOTE — ED NOTES
Arrives with a nose bleed that started after blowing his nose this morning, alert and oriented, denies pain, ABCs intact.

## 2018-01-15 NOTE — LETTER
January 15, 2018      To Whom It May Concern:      Parth Valdez was seen in our Emergency Department today, 01/15/18.  I expect his condition to improve over the next 1-2 days.  He may return to work when improved.    Sincerely,        Shweta Villegas RN

## 2018-01-15 NOTE — ED AVS SNAPSHOT
[facility] Emergency Department    [address]    [facility] [address]    Phone:  [phone]    Fax:  [phone]                                       Parth Valdez   MRN: [id number]    Department:  [facility] Emergency Department   Date of Visit:  1/15/2018           After Visit Summary Signature Page     I have received my discharge instructions, and my questions have been answered. I have discussed any challenges I see with this plan with the nurse or doctor.    ..........................................................................................................................................  Patient/Patient Representative Signature      ..........................................................................................................................................  Patient Representative Print Name and Relationship to Patient    ..................................................               ................................................  Date                                            Time    ..........................................................................................................................................  Reviewed by Signature/Title    ...................................................              ..............................................  Date                                                            Time

## 2018-01-15 NOTE — ED PROVIDER NOTES
"  History     Chief Complaint:  Epistaxis    HPI   Parth Valdez is an non-anticoagulated 47 year old male with a history who presents with epistaxis. This morning, the patient reports that he began experiencing epistaxis from the right nare after blowing his nose, which he was unable to stop, prompting his visit to the emergency department. Here now in the emergency department, he continues to have epistaxis and states that he has a post nasal drip with blood. He denies dizziness, lightheadedness, headaches, or chest pain. No history of cocaine use.     Of note, the patient additionally reports that he has had one week of heart palpitations, which have been constant since onset and which he describes as a \"pounding heart beat\" after starting Flomax for his recent difficulty urinating per urologist (Dr. Rodriguez) recommendation. Denies any sensation of fast, irregular, or skipped heartbeats.  No associated lightheadedness or dizziness.  There are no significant exacerbating or alleviating factors for these palpitations. However, he reports that these palpitations are not his primary concern and did not prompt his visit today to the emergency department today.  Additionally of note,  during his last emergency department visit last month for difficulty urinating, he had slightly elevated blood pressure to 160s systolic.      Allergies:  No Known Allergies     Medications:    Flomax    Past Medical History:    Hernia, Abdominal    Past Surgical History:    Hernia Repair    Family History:    Hypertension  Diabetes mellitus    Social History:  The patient denies tobacco or alcohol use.  Marital status: .     Review of Systems   HENT: Positive for nosebleeds.    Cardiovascular: Positive for palpitations. Negative for chest pain.   Neurological: Negative for dizziness, light-headedness and headaches.   All other systems reviewed and are negative.    Physical Exam   First Vitals:  BP: (!) 197/119  Pulse: " 74  Temp: 97.7  F (36.5  C)  Resp: 20  SpO2: 98 %    Physical Exam  Constitutional:  Well developed, Well nourished, comfortable appearing   HENT: Normocephalic,Mucous membranes moist. Small amount of bright red blood in the right nare. No active bleeding. No blood in the posterior pharynx. Clamp in place.   Respiratory:  No tachypnea or respiratory distress  Musculoskeletal:  No gross deformities, grossly unremarkable range of motion   Integument:  Warm, Dry   Neurologic:  Alert, attentive and appropriately oriented  Psychiatric:  Mood and affect normal.     Emergency Department Course   ECG:  Indication: Palpitations  Time: 0811  Vent. Rate 66 bpm. OK interval 168 ms. QRS duration 84 ms. QT/QTc 382/400 ms. P-R-T axis 30 -11 19. Normal sinus rhythm. Normal ECG. Read time: 0815.     Laboratory:  CBC: WBC: 5.8, HGB: 15.6, PLT: 227    BMP: all WNL (Creatinine: 0.76)    TSH with free T4 reflex: 1.23    Procedures:  PROCEDURE: Silver Nitrate Cautery    PROCEDURE NOTE: Bleeding had stopped. No preparation given/needed.  I cleared away dried blood and reexamine the naris; a possible site of the patient's bleeding in the right nasal septum was identified and cauterized with silver nitrate.  The patient tolerated the procedure well and there were no complications.  He was observed in the emergency department following the procedure and had no recurrence of his bleeding.    Emergency Department Course:  Nursing notes and vitals reviewed. 0806 I performed an exam of the patient as documented above.     EKG obtained in the ED, see results above. The patient was placed on continuous pulse oximetry and cardiac monitoring while here in the ED.      Blood drawn. This was sent to the lab for further testing, results above.    0945 I rechecked the patient.  I have been informed the patient had recurrent bleeding.  On reexamination, bleeding was stopped.  Silver nitrate cautery was performed, as noted above.     Findings and plan  explained to the Patient. Patient discharged home with instructions regarding supportive care, medications, and reasons to return. The importance of close follow-up was reviewed.     I personally reviewed the laboratory results with the Patient and answered all related questions prior to discharge.     Impression & Plan    Medical Decision Making:  Parth Valdez is a 47 year old male who presents for evaluation of epistaxis. The patient additionally noted some recent palpitations in the past week as well. In regard to his epistaxis, the bleeding was controlled with interventions in the ED and therefore supportive outpatient management is indicated.  Close follow-up with ENT and primary care; see discharge instructions. There are no signs of coagulopathy causing the bleeding or a general medical condition (thrombocytopenia, DIC, leukemia, etc) causing the bleeding today. Silver nitrate was used to cauterize a possible bleeding source, see above procedure note.  The bleeding stopped and did not restart here in ED, therefore no nasal packing is indicated.      In regards to his palpitations, initial ECG shows sinus rhythm without any acute ischemic changes or arrhthymias.  A broad differential diagnosis was considered including SVT, Atrial fibrillation, ventricular arrhythmia, thyroid disease, acute electrolyte abnormality,  drugs/medications, caffeine intake or other stimulants, medication side effect, anemia, heart disease, PE, etc. he had subjective symptoms throughout his course here, but had a normal sinus rhythm on the monitor throughout. The workup and exam here in ED would indicate that supportive outpatient management is indicated.  I doubt PE as patient PERC negative.  Doubt acute coronary syndrome given symptoms and exam.  Will have them follow up with primary care for further evaluation of this issue.      Diagnosis:    ICD-10-CM   1. Epistaxis R04.0   2. Palpitations R00.2        Disposition:  discharged to home  I, Gina Noble, am serving as a scribe on 1/15/2018 at 8:06 AM to personally document services performed by Jamila Conteh MD based on my observations and the provider's statements to me.     Gina Noble  1/15/2018   [facility] EMERGENCY DEPARTMENT       Jamila Conteh MD  01/15/18 [id number]

## 2018-01-16 NOTE — DISCHARGE INSTRUCTIONS
"Discharge Instructions  Epistaxis    Today you were seen for a nosebleed.   Nosebleeds (the medical term is \"epistaxis\") are very common. Almost every person has had at least one in their lifetime.  Although the amount of blood loss can appear dramatic, nosebleeds rarely cause serious problems.  The most common causes are dry air or nose picking, but they also are common in people who have allergies, high blood pressure, or are on blood thinners (such as Coumadin, Aspirin or Plavix). If you or your child gets a nosebleed, the important thing is to know how to take care of it. With the right self-care, most nosebleeds will stop on their own.    Generally, every Emergency Department visit should have a follow-up clinic visit with either a primary or a specialty clinic/provider. Please follow-up as instructed by your emergency provider today.    Return to the Emergency Department if:    Your nose is bleeding a very large amount of blood and you are unable to stop it.    You get very pale, faint, or tired.    You cannot get the bleeding to stop after following these instructions.    Treatment:    Your provider may tell you to use a decongestant nose spray, like Afrin  (oxymetazoline), in both nostrils in the morning and at night for the next three days. Do not use this medicine for more than three days at a time.  If you do, it will cause nasal congestion.     Use a moisturizer. A small amount of Vaseline  to the inside of your nostrils for moisture before bed is one option. There are nasal sprays available over-the-counter for this purpose as well.  Using a humidifier in your bedroom or home will help as well when the air is dry.    For the next three days, do not blow your nose or put anything in your nose. You may sniffle, or dab the outside of your nose.    Do not bend with your head below your waist for the next three days. Do not lift anything so heavy that you have to strain.     If you received nasal packing, " please do not remove the packing until seen by an Ear, Nose, and Throat (ENT) specialist.  If antibiotics have been given with the packing, please take as directed.    If your nose starts to bleed again:    Blow your nose to get rid of some of the clots that have formed inside your nostrils. This may increase the bleeding temporarily, but that is okay.    Spray decongestant nose spray (like Afrin ) into both nostrils to constrict the blood vessels.    Sit or stand while bending forward slightly at the waist. Do not lie down or tilt your head back. This may cause you to swallow blood and can lead to vomiting.     the soft part of BOTH nostrils at the bottom of your nose and squeeze your nose closed for at least 5 minutes (for children) or 10 to 15 minutes (for adults). Use a clock to time yourself. Do not release the pressure every so often to check whether the bleeding has stopped. Many people hurt their chances of stopping the bleeding by releasing the pressure too soon or too often.    If you follow the steps outlined above, and your nose continues to bleed, repeat all the steps once more. Apply pressure for a total of at least 30 minutes. If you continue to bleed even then, seek medical attention.  If you were given a prescription for medicine here today, be sure to read all of the information (including the package insert) that comes with your prescription.  This will include important information about the medicine, its side effects, and any warnings that you need to know about.  The pharmacist who fills the prescription can provide more information and answer questions you may have about the medicine.  If you have questions or concerns that the pharmacist cannot address, please call or return to the Emergency Department.   Remember that you can always come back to the Emergency Department if you are not able to see your regular provider in the amount of time listed above, if you get any new symptoms, or if  there is anything that worries you.

## 2018-01-16 NOTE — ED PROVIDER NOTES
History     Chief Complaint:  Epistaxis    HPI   Parth Valdez is a 47 year old male who presents to the emergency department today for evaluation of epistaxis. The patient reports his bleeding began around 5 am this morning. Per chart review, the patient presented to the emergency department here at 8 am. On this earlier visit, he was given silver nitrate cautery and bleeding became controlled. About an hour ago, his bleeding restarted, prompting him to present now to the emergency department. He reports having a headache but denies any other injuries or illnesses.     Allergies:  No Known Drug Allergies    Medications:    Flomax    Past Medical History:    No pertinent past medical history.     Past Surgical History:    No pertinent past surgical history.     Family History:    History reviewed. No pertinent family history.     Social History:  The patient was alone in the emergency department.   Smoking Status: former  Smokeless Tobacco: never  Alcohol Use: no  Marital Status:        Review of Systems   HENT:        Epistaxis   Neurological: Positive for headaches.   All other systems reviewed and are negative.    Physical Exam   First Vitals: /87  Temp 97.7  F (36.5  C) (Oral)  Resp 16  SpO2 96%    Physical Exam  General: Alert, appears well-developed and well-nourished. Cooperative.     In no acute distress.  Omani speaking  HEENT:  Head:  Atraumatic  Ears:  External ears are normal  Nose:  Right nare with bleeding to septum, but no single identified bleeding vessel.  Left nare appears clear, no dried blood.    Mouth/Throat:  Oropharynx is without erythema or exudate and mucous membranes are moist. Posterior oropharynx with trace appearance of blood.  Eyes:   Conjunctivae normal and EOM are normal. No scleral icterus.    Pupils are equal, round, and reactive to light.   CV:  Normal rate, regular rhythm, normal heart sounds and radial pulses are 2+ and symmetric.  No  murmur.  Resp:  Breath sounds are clear bilaterally    Non-labored, no retractions or accessory muscle use  GI:  Abdomen is soft, no distension, no tenderness. No rebound or guarding.  MS:  Normal range of motion. No edema.  Skin:  Warm and dry.  No rash or lesions noted.  Neuro: Alert.GCS: 15  Psych:  Normal mood and affect.    Emergency Department Course   Procedures:  Procedure:  Epistaxis  management  Performed by Giovanny Moser MD    Bleeding noted in right side nare  Preparation: Afrin 2 sprays R nare & L nare, Lidocaine 4% atomized 3cc R Nare    Treatment:   Direct compression:Yes  Cautery:No  Topical Afrin:Yes    Packing:  Length 5cm Merocel x1 to right nose    Reassessment:  Bleeding controlled: Yes    Emergency Department Course:  Nursing notes and vitals reviewed.  I performed an exam of the patient as documented above.     2325 Patient rechecked and updated.     Findings and plan explained to the Patient. Patient discharged home with instructions regarding supportive care, medications, and reasons to return. The importance of close follow-up was reviewed. The patient was prescribed Augmentin and percocet.     Impression & Plan      Medical Decision Making:  Parth Valdez is a 47 year old male who presents for evaluation of nasal bleeding and epistaxis. The patient was here earlier today for epistaxis and silver nitrate was used to stop bleeding. About an hour ago, bleeding restarted and the patient returned. Short Merocel packing was used to stop the bleeding in right nare per procedure note.  Lidocaine and Afrin were used prior to Merocel placement. The risks and benefits of packing were discussed with patient verbally and they consented to packing. See procedure note above for packing. There was no bleeding after the nasal pack was placed.The bleeding was controlled with interventions in the ED and therefore supportive outpatient management is indicated.  Close follow-up with ENT and primary  care; see discharge instructions. There are no signs of coagulopathy causing the bleeding or a general medical condition (thrombocytopenia, DIC, leukemia, etc) causing the bleeding today. I did review laboratories from visit earlier today.  I will place patient on augmentin antibiotics to minimize risk of sinusitis and toxic shock syndrome. Pain control will be given as well upon discharge.  Did discuss appropriate use of narcotic pain medication.  Will follow up with ENT prior to the end of the week for reassessment of right nasal packing and epistaxis.  Discharge home after all questions answered    Due to language barrier, an  was present during the history-taking and subsequent discussion (and for part of the physical exam) with this patient.    Diagnosis:      1. Epistaxis     Disposition:  Discharged to home     Discharge Medications:  New Prescriptions    AMOXICILLIN-CLAVULANATE (AUGMENTIN) 875-125 MG PER TABLET    Take 1 tablet by mouth 2 times daily for 7 days    OXYCODONE-ACETAMINOPHEN (PERCOCET) 5-325 MG PER TABLET    Take 1-2 tablets by mouth every 4 hours as needed for pain     Scribe Disclosure:  I, Blake Bailey, am serving as a scribe at 10:06 PM on 1/15/2018 to document services personally performed by Giovanny Moser MD based on my observations and the provider's statements to me.     1/15/2018   [facility] EMERGENCY DEPARTMENT       Giovanny Moser MD  01/16/18 0039

## 2018-01-16 NOTE — ED NOTES
Pt was seen earlier today for nosebleed and sent home. Pt reports that nose has been bleeding again since 9pm tonight. PT has nose clamp on. Pt reports feeling like he is swallowing blood. ABC intact. A/O x4.

## 2018-01-24 DIAGNOSIS — N40.0 BPH (BENIGN PROSTATIC HYPERPLASIA): ICD-10-CM

## 2018-01-24 DIAGNOSIS — R39.9 LOWER URINARY TRACT SYMPTOMS (LUTS): Primary | ICD-10-CM

## 2018-01-25 ENCOUNTER — OFFICE VISIT (OUTPATIENT)
Dept: UROLOGY | Facility: CLINIC | Age: 48
End: 2018-01-25
Payer: COMMERCIAL

## 2018-01-25 VITALS
DIASTOLIC BLOOD PRESSURE: 102 MMHG | HEIGHT: 66 IN | WEIGHT: 230 LBS | BODY MASS INDEX: 36.96 KG/M2 | HEART RATE: 60 BPM | SYSTOLIC BLOOD PRESSURE: 154 MMHG

## 2018-01-25 DIAGNOSIS — R31.29 MICROHEMATURIA: Primary | ICD-10-CM

## 2018-01-25 DIAGNOSIS — R39.12 BENIGN PROSTATIC HYPERPLASIA WITH WEAK URINARY STREAM: ICD-10-CM

## 2018-01-25 DIAGNOSIS — N40.1 BENIGN PROSTATIC HYPERPLASIA WITH WEAK URINARY STREAM: ICD-10-CM

## 2018-01-25 DIAGNOSIS — R39.9 LOWER URINARY TRACT SYMPTOMS (LUTS): ICD-10-CM

## 2018-01-25 LAB
ALBUMIN UR-MCNC: NEGATIVE MG/DL
APPEARANCE UR: CLEAR
BILIRUB UR QL STRIP: NEGATIVE
COLOR UR AUTO: YELLOW
GLUCOSE UR STRIP-MCNC: NEGATIVE MG/DL
HGB UR QL STRIP: ABNORMAL
KETONES UR STRIP-MCNC: NEGATIVE MG/DL
LEUKOCYTE ESTERASE UR QL STRIP: NEGATIVE
NITRATE UR QL: NEGATIVE
PH UR STRIP: 5.5 PH (ref 5–7)
RESIDUAL VOLUME (RV) (EXTERNAL): 12
SOURCE: ABNORMAL
SP GR UR STRIP: 1.02 (ref 1–1.03)
UROBILINOGEN UR STRIP-ACNC: 0.2 EU/DL (ref 0.2–1)

## 2018-01-25 PROCEDURE — 99207 ZZC NO CHARGE LOS: CPT | Performed by: UROLOGY

## 2018-01-25 PROCEDURE — 52000 CYSTOURETHROSCOPY: CPT | Performed by: UROLOGY

## 2018-01-25 PROCEDURE — 51741 ELECTRO-UROFLOWMETRY FIRST: CPT | Performed by: UROLOGY

## 2018-01-25 PROCEDURE — 51798 US URINE CAPACITY MEASURE: CPT | Performed by: UROLOGY

## 2018-01-25 PROCEDURE — 81003 URINALYSIS AUTO W/O SCOPE: CPT | Performed by: UROLOGY

## 2018-01-25 ASSESSMENT — PAIN SCALES - GENERAL: PAINLEVEL: NO PAIN (1)

## 2018-01-25 NOTE — PROGRESS NOTES
Patient presents for cystoscopy for microhematuria. Also had history of slowed stream and some sp pain with bladder fullness. These have responded well to tamsulosin.         January 25, 2018 CYSTOSCOPY:  The patient was brought to the procedures room where he was placed in the supine position.  He was prepped and draped in a sterile fashion.  A #16-Guamanian flexible cystoscope was introduced into the urinary bladder.  The anterior urethra and membranous urethra were negative.  The prostatic urethra was minimally obstructing with a very slight enlargement of the lateral lobes.  Within the urinary bladder, there was no evidence of stones, tumors, or growths.  The ureteral orifices were well visualized bilaterally and found to have clear efflux of urine.   The patient had minimal trabeculation.    On retroflex view, no lesions were seen.     Negative work up for microhematuria   Continue tamsulosin (will call for refill rx)   Follow up in six months

## 2018-01-25 NOTE — MR AVS SNAPSHOT
"              After Visit Summary   1/25/2018    Parth Valdez    MRN: [id number]           Patient Information     Date Of Birth          1970        Visit Information        Provider Department      1/25/2018 9:30 AM Lorna Storm MD; [employer] [facility] [place]        Today's Diagnoses     Microhematuria    -  1    Lower urinary tract symptoms (LUTS)        Benign prostatic hyperplasia with weak urinary stream          Care Instructions         AFTER YOUR CYSTOSCOPY         You have just completed a cystoscopy, or \"cysto\", which allowed your physician to learn more about your bladder (or to remove a stent placed after surgery). We suggest that you continue to avoid caffeine, fruit juice, and alcohol for the next 24 hours, however, you are encouraged to return to your normal activities.       A few things that are considered normal after your cystoscopy:    * small amount of bleeding (or spotting) that clears within the next 24 hours    * slight burning sensation with urination    * sensation to of needing to avoid more frequently    * the feeling of \"air\" in your urine    * mild discomfort that is relieved with Tylonol        Please contact our office promptly if you:    * develop a fever above 101 degrees    * are unable to urinate    * develop bright red blood that does not stop    * severe pain or swelling        And of course, please contact our office with any concerns or questions [phone]              Follow-ups after your visit        Follow-up notes from your care team     Return in about 6 months (around 7/25/2018).      Your next 10 appointments already scheduled     Jul 24, 2018  9:30 AM CDT   Return Visit with Lorna Storm MD   [facility] ([facility])    [address]  [address]  [facility] [address] [phone]              Who to contact     " "If you have questions or need follow up information about today's clinic visit or your schedule please contact [facility] [place] directly at [phone].  Normal or non-critical lab and imaging results will be communicated to you by MyChart, letter or phone within 4 business days after the clinic has received the results. If you do not hear from us within 7 days, please contact the clinic through [employer] or phone. If you have a critical or abnormal lab result, we will notify you by phone as soon as possible.  Submit refill requests through [employer] or call your pharmacy and they will forward the refill request to us. Please allow 3 business days for your refill to be completed.          Additional Information About Your Visit        [employer] Information     [employer] lets you send messages to your doctor, view your test results, renew your prescriptions, schedule appointments and more. To sign up, go to [email or web address] . Click on \"Log in\" on the left side of the screen, which will take you to the Welcome page. Then click on \"Sign up Now\" on the right side of the page.     You will be asked to enter the access code listed below, as well as some personal information. Please follow the directions to create your username and password.     Your access code is: [email or web address]  Expires: 3/20/2018  9:04 AM     Your access code will  in 90 days. If you need help or a new code, please call your [place] clinic or [phone].        Care EveryWhere ID     This is your Care EveryWhere ID. This could be used by other organizations to access your [place] medical records  [id number]        Your Vitals Were     Pulse Height BMI (Body Mass Index)             60 1.676 m (5' 6\") 37.12 kg/m2          Blood Pressure from Last 3 Encounters:   18 (!) 154/102   01/15/18 133/88   01/15/18 132/87    Weight from Last 3 Encounters:   18 104.3 kg (230 lb)   18 104.3 kg (230 lb) "   12/20/17 108.7 kg (239 lb 9.6 oz)              We Performed the Following     COMPLEX UROFLOWMETRY (74274)     MEASURE POST-VOID RESIDUAL URINE/BLADDER CAPACITY, US NON-IMAGING     UA without Microscopic        Primary Care Provider Office Phone # Fax #    [facility] [phone]       [address] [facility] [address]        Equal Access to Services     BEV VICTORIA : Hadii aad ku hadasho Soomaali, waaxda luqadaha, qaybta kaalmada adeegyada, waxay idiin hayaan adeeg kharash la'aan . So [facility] [phone].    ATENCIÓN: Si habla español, tiene a henry disposición servicios gratuitos de asistencia lingüística. Llame al [phone].    We comply with applicable federal civil rights laws and [place] laws. We do not discriminate on the basis of race, color, national origin, age, disability, sex, sexual orientation, or gender identity.            Thank you!     Thank you for choosing [facility] [place]  for your care. Our goal is always to provide you with excellent care. Hearing back from our patients is one way we can continue to improve our services. Please take a few minutes to complete the written survey that you may receive in the mail after your visit with us. Thank you!             Your Updated Medication List - Protect others around you: Learn how to safely use, store and throw away your medicines at www.disposemymeds.org.          This list is accurate as of 1/25/18 10:17 AM.  Always use your most recent med list.                   Brand Name Dispense Instructions for use Diagnosis    AUGMENTIN PO           ibuprofen 200 MG tablet    ADVIL/MOTRIN    60 tablet    Take 3 tablets (600 mg) by mouth every 6 hours as needed for mild pain or fever        tamsulosin 0.4 MG capsule    FLOMAX    90 capsule    Take 1 capsule (0.4 mg) by mouth daily    Microscopic hematuria

## 2018-01-25 NOTE — LETTER
1/25/2018       RE: Parth Valdez  [address]   [facility] [address]     Dear Colleague,    Thank you for referring your patient, Parth Valdez, to the [facility] [place] at [facility]. Please see a copy of my visit note below.    Patient presents for cystoscopy for microhematuria. Also had history of slowed stream and some sp pain with bladder fullness. These have responded well to tamsulosin.         January 25, 2018 CYSTOSCOPY:  The patient was brought to the procedures room where he was placed in the supine position.  He was prepped and draped in a sterile fashion.  A #16-Georgian flexible cystoscope was introduced into the urinary bladder.  The anterior urethra and membranous urethra were negative.  The prostatic urethra was minimally obstructing with a very slight enlargement of the lateral lobes.  Within the urinary bladder, there was no evidence of stones, tumors, or growths.  The ureteral orifices were well visualized bilaterally and found to have clear efflux of urine.   The patient had minimal trabeculation.    On retroflex view, no lesions were seen.     Negative work up for microhematuria   Continue tamsulosin (will call for refill rx)   Follow up in six months       Again, thank you for allowing me to participate in the care of your patient.      Sincerely,    Lorna Storm MD

## 2018-01-25 NOTE — PATIENT INSTRUCTIONS
"     AFTER YOUR CYSTOSCOPY         You have just completed a cystoscopy, or \"cysto\", which allowed your physician to learn more about your bladder (or to remove a stent placed after surgery). We suggest that you continue to avoid caffeine, fruit juice, and alcohol for the next 24 hours, however, you are encouraged to return to your normal activities.       A few things that are considered normal after your cystoscopy:    * small amount of bleeding (or spotting) that clears within the next 24 hours    * slight burning sensation with urination    * sensation to of needing to avoid more frequently    * the feeling of \"air\" in your urine    * mild discomfort that is relieved with Tylonol        Please contact our office promptly if you:    * develop a fever above 101 degrees    * are unable to urinate    * develop bright red blood that does not stop    * severe pain or swelling        And of course, please contact our office with any concerns or questions [phone]      "

## 2018-01-25 NOTE — NURSING NOTE
Chief Complaint   Patient presents with     Cystoscopy     BPH and LUTS     Johan Mendoza LPN 9:34 AM January 25, 2018    Prior to the start of the procedure and with procedural staff participation, I verbally confirmed the patient s identity using two indicators, relevant allergies, that the procedure was appropriate and matched the consent or emergent situation, and that the correct equipment/implants were available. Immediately prior to starting the procedure I conducted the Time Out with the procedural staff and re-confirmed the patient s name, procedure, and site/side. I have wiped the patient off with the povidone-Iodine solution, draped them,  used Lidocaine hydrochloride jelly, and instilled sterile water into the bladder. (The Joint Commission universal protocol was followed.)  Yes    Sedation (Moderate or Deep): None    Johan Mendoza LPN 9:34 AM January 25, 2018

## 2018-02-07 ENCOUNTER — OFFICE VISIT (OUTPATIENT)
Dept: INTERNAL MEDICINE | Facility: CLINIC | Age: 48
End: 2018-02-07
Payer: COMMERCIAL

## 2018-02-07 VITALS
BODY MASS INDEX: 39.31 KG/M2 | SYSTOLIC BLOOD PRESSURE: 134 MMHG | TEMPERATURE: 98.2 F | HEIGHT: 66 IN | OXYGEN SATURATION: 96 % | HEART RATE: 62 BPM | RESPIRATION RATE: 16 BRPM | DIASTOLIC BLOOD PRESSURE: 84 MMHG | WEIGHT: 244.6 LBS

## 2018-02-07 DIAGNOSIS — R03.0 ELEVATED BLOOD PRESSURE READING WITHOUT DIAGNOSIS OF HYPERTENSION: Primary | ICD-10-CM

## 2018-02-07 PROCEDURE — 99213 OFFICE O/P EST LOW 20 MIN: CPT | Performed by: NURSE PRACTITIONER

## 2018-02-07 PROCEDURE — T1013 SIGN LANG/ORAL INTERPRETER: HCPCS | Mod: U3 | Performed by: NURSE PRACTITIONER

## 2018-02-07 NOTE — MR AVS SNAPSHOT
After Visit Summary   2/7/2018    Parth Valdez    MRN: [id number]           Patient Information     Date Of Birth          1970        Visit Information        Provider Department      2/7/2018 7:45 AM Juan Pablo Issa Sherri Rubin, NP [facility]        Today's Diagnoses     Elevated blood pressure reading without diagnosis of hypertension    -  1       Follow-ups after your visit        Your next 10 appointments already scheduled     Mar 07, 2018  6:40 AM CST   Office Visit with Christina Melendez NP   [facility] ([facility]    [address]  [facility] [id number] [phone]           Bring a current list of meds and any records pertaining to this visit. For Physicals, please bring immunization records and any forms needing to be filled out. Please arrive 10 minutes early to complete paperwork.            Jul 24, 2018  9:30 AM CDT   Return Visit with Lorna Storm MD   [facility] [place] ([facility])    [address]  [address]  [facility] [address] [phone]              Who to contact     If you have questions or need follow up information about today's clinic visit or your schedule please contact [facility] directly at [phone].  Normal or non-critical lab and imaging results will be communicated to you by MyChart, letter or phone within 4 business days after the clinic has received the results. If you do not hear from us within 7 days, please contact the clinic through MyChart or phone. If you have a critical or abnormal lab result, we will notify you by phone as soon as possible.  Submit refill requests through [employer] or call your pharmacy and they will forward the refill request to us. Please allow 3 business days for your refill to be completed.          Additional Information About Your Visit       "  MyChart Information     [employer] lets you send messages to your doctor, view your test results, renew your prescriptions, schedule appointments and more. To sign up, go to [email or web address] . Click on \"Log in\" on the left side of the screen, which will take you to the Welcome page. Then click on \"Sign up Now\" on the right side of the page.     You will be asked to enter the access code listed below, as well as some personal information. Please follow the directions to create your username and password.     Your access code is: [email or web address]  Expires: 3/20/2018  9:04 AM     Your access code will  in 90 days. If you need help or a new code, please call your [place] clinic or [phone].        Care EveryWhere ID     This is your Care EveryWhere ID. This could be used by other organizations to access your [place] medical records  [id number]        Your Vitals Were     Pulse Temperature Respirations Height Pulse Oximetry BMI (Body Mass Index)    62 98.2  F (36.8  C) (Oral) 16 5' 6\" (1.676 m) 96% 39.48 kg/m2       Blood Pressure from Last 3 Encounters:   18 134/84   18 (!) 154/102   01/15/18 133/88    Weight from Last 3 Encounters:   18 244 lb 9.6 oz (110.9 kg)   18 230 lb (104.3 kg)   18 230 lb (104.3 kg)              Today, you had the following     No orders found for display       Primary Care Provider Office Phone # Fax #    Christina Melendez [phone]       [address] [facility] [address]        Equal Access to Services     BEV VICTORIA : Artie Gary, ivan lee, chico kaalmada deya, grant box. So [facility] [phone].    ATENCIÓN: Si habla español, tiene a henry disposición servicios gratuitos de asistencia lingüística. Llame al [phone].    We comply with applicable federal civil rights laws and [place] laws. We do not discriminate on the basis of race, color, national " origin, age, disability, sex, sexual orientation, or gender identity.            Thank you!     Thank you for choosing [facility]  for your care. Our goal is always to provide you with excellent care. Hearing back from our patients is one way we can continue to improve our services. Please take a few minutes to complete the written survey that you may receive in the mail after your visit with us. Thank you!             Your Updated Medication List - Protect others around you: Learn how to safely use, store and throw away your medicines at www.disposemymeds.org.          This list is accurate as of 2/7/18  8:46 AM.  Always use your most recent med list.                   Brand Name Dispense Instructions for use Diagnosis    AUGMENTIN PO           ibuprofen 200 MG tablet    ADVIL/MOTRIN    60 tablet    Take 3 tablets (600 mg) by mouth every 6 hours as needed for mild pain or fever        tamsulosin 0.4 MG capsule    FLOMAX    90 capsule    Take 1 capsule (0.4 mg) by mouth daily    Microscopic hematuria

## 2018-02-07 NOTE — NURSING NOTE
"Chief Complaint   Patient presents with     Establish Care       Initial /84 (BP Location: Right arm, Patient Position: Sitting, Cuff Size: Adult Large)  Pulse 62  Temp 98.2  F (36.8  C) (Oral)  Resp 16  Ht 5' 6\" (1.676 m)  Wt 244 lb 9.6 oz (110.9 kg)  SpO2 96%  BMI 39.48 kg/m2 Estimated body mass index is 39.48 kg/(m^2) as calculated from the following:    Height as of this encounter: 5' 6\" (1.676 m).    Weight as of this encounter: 244 lb 9.6 oz (110.9 kg).  Medication Reconciliation: complete    "

## 2018-02-07 NOTE — PROGRESS NOTES
"  SUBJECTIVE:   Parth Valdez is a 47 year old male who presents to clinic today for the following health issues:      Observe BP, has been elevated at recent OV  Not restricting salt, no exercise or wt loss  FH HTN, diabetes          There is no problem list on file for this patient.    Past Surgical History:   Procedure Laterality Date     HERNIA REPAIR         Social History   Substance Use Topics     Smoking status: Former Smoker     Smokeless tobacco: Never Used     Alcohol use No     Family History   Problem Relation Age of Onset     Hypertension Mother      DIABETES Father          Current Outpatient Prescriptions   Medication Sig Dispense Refill     Amoxicillin-Pot Clavulanate (AUGMENTIN PO)        tamsulosin (FLOMAX) 0.4 MG capsule Take 1 capsule (0.4 mg) by mouth daily 90 capsule 3     ibuprofen (ADVIL/MOTRIN) 200 MG tablet Take 3 tablets (600 mg) by mouth every 6 hours as needed for mild pain or fever 60 tablet 0     BP Readings from Last 3 Encounters:   02/07/18 134/84   01/25/18 (!) 154/102   01/15/18 133/88    Wt Readings from Last 3 Encounters:   02/07/18 244 lb 9.6 oz (110.9 kg)   01/25/18 230 lb (104.3 kg)   01/02/18 230 lb (104.3 kg)                    Reviewed and updated as needed this visit by clinical staff  Tobacco  Allergies  Meds  Med Hx  Surg Hx  Fam Hx  Soc Hx      Reviewed and updated as needed this visit by Provider         ROS:  C: NEGATIVE for fever, chills, change in weight  E/M: NEGATIVE for ear, mouth and throat problems  R: NEGATIVE for significant cough or SOB  CV: NEGATIVE for chest pain, palpitations or peripheral edema    OBJECTIVE:     /84 (BP Location: Right arm, Patient Position: Sitting, Cuff Size: Adult Large)  Pulse 62  Temp 98.2  F (36.8  C) (Oral)  Resp 16  Ht 5' 6\" (1.676 m)  Wt 244 lb 9.6 oz (110.9 kg)  SpO2 96%  BMI 39.48 kg/m2  Body mass index is 39.48 kg/(m^2).  GENERAL: alert, no distress and obese  Thyroid normal  Lungs clear  Heart " RRR, no pedal edema      ASSESSMENT/PLAN:       (R03.0) Elevated blood pressure reading without diagnosis of hypertension  (primary encounter diagnosis)  Comment:   Plan: BOB diet, exercise, wt loss.  F/u 1 month              There are no Patient Instructions on file for this visit.    Christina Melendez NP  [facility]

## 2018-07-20 DIAGNOSIS — R39.198 SLOWING OF URINARY STREAM: ICD-10-CM

## 2018-07-20 DIAGNOSIS — R31.29 MICROHEMATURIA: Primary | ICD-10-CM

## 2018-07-26 ENCOUNTER — OFFICE VISIT (OUTPATIENT)
Dept: UROLOGY | Facility: CLINIC | Age: 48
End: 2018-07-26
Payer: COMMERCIAL

## 2018-07-26 VITALS
DIASTOLIC BLOOD PRESSURE: 80 MMHG | WEIGHT: 235 LBS | HEART RATE: 64 BPM | SYSTOLIC BLOOD PRESSURE: 134 MMHG | BODY MASS INDEX: 37.77 KG/M2 | HEIGHT: 66 IN

## 2018-07-26 DIAGNOSIS — R31.29 MICROSCOPIC HEMATURIA: ICD-10-CM

## 2018-07-26 DIAGNOSIS — R39.11 URINARY HESITANCY: Primary | ICD-10-CM

## 2018-07-26 DIAGNOSIS — R31.29 MICROHEMATURIA: ICD-10-CM

## 2018-07-26 LAB
ALBUMIN UR-MCNC: NEGATIVE MG/DL
APPEARANCE UR: CLEAR
BILIRUB UR QL STRIP: NEGATIVE
COLOR UR AUTO: YELLOW
GLUCOSE UR STRIP-MCNC: NEGATIVE MG/DL
HGB UR QL STRIP: ABNORMAL
KETONES UR STRIP-MCNC: NEGATIVE MG/DL
LEUKOCYTE ESTERASE UR QL STRIP: NEGATIVE
MUCOUS THREADS #/AREA URNS LPF: PRESENT /LPF
NITRATE UR QL: NEGATIVE
PH UR STRIP: 6 PH (ref 5–7)
RBC #/AREA URNS AUTO: <1 /HPF (ref 0–2)
RESIDUAL VOLUME (RV) (EXTERNAL): 31
SOURCE: ABNORMAL
SP GR UR STRIP: 1.01 (ref 1–1.03)
UROBILINOGEN UR STRIP-ACNC: 0.2 EU/DL (ref 0.2–1)
WBC #/AREA URNS AUTO: 0 /HPF (ref 0–5)

## 2018-07-26 PROCEDURE — 87086 URINE CULTURE/COLONY COUNT: CPT | Performed by: UROLOGY

## 2018-07-26 PROCEDURE — 51798 US URINE CAPACITY MEASURE: CPT | Performed by: UROLOGY

## 2018-07-26 PROCEDURE — 88112 CYTOPATH CELL ENHANCE TECH: CPT | Performed by: UROLOGY

## 2018-07-26 PROCEDURE — 81001 URINALYSIS AUTO W/SCOPE: CPT | Performed by: UROLOGY

## 2018-07-26 PROCEDURE — 99213 OFFICE O/P EST LOW 20 MIN: CPT | Mod: 25 | Performed by: UROLOGY

## 2018-07-26 RX ORDER — TAMSULOSIN HYDROCHLORIDE 0.4 MG/1
0.4 CAPSULE ORAL DAILY
Qty: 90 CAPSULE | Refills: 3 | Status: SHIPPED | OUTPATIENT
Start: 2018-07-26

## 2018-07-26 ASSESSMENT — PAIN SCALES - GENERAL: PAINLEVEL: NO PAIN (0)

## 2018-07-26 NOTE — PROGRESS NOTES
"UROLOGIC DIAGNOSES:       CURRENT INTERVENTIONS:       HISTORY:     Patient presents for follow for two issues: lower urinary tract symptoms and microhematuria.   Had previous work up for microhematuria that was negative.     Started tamsulosin with good improvement in lower urinary tract symptoms (hesitancy, intermittency, and sensation of incomplete emptying). He notes however that after intercourse multiple times this weeks his symptoms have recurred.       We discussed the above and discussed continuing tamsulosin (ongoing). Also discussed follow up for microhematuria.         PAST MEDICAL HISTORY:   Past Medical History:   Diagnosis Date     Hernia, abdominal        PAST SURGICAL HISTORY:   Past Surgical History:   Procedure Laterality Date     HERNIA REPAIR         FAMILY HISTORY:   Family History   Problem Relation Age of Onset     Hypertension Mother      Diabetes Father        SOCIAL HISTORY:   Social History   Substance Use Topics     Smoking status: Former Smoker     Smokeless tobacco: Never Used     Alcohol use No       Current Outpatient Prescriptions   Medication     tamsulosin (FLOMAX) 0.4 MG capsule     Amoxicillin-Pot Clavulanate (AUGMENTIN PO)     ibuprofen (ADVIL/MOTRIN) 200 MG tablet     No current facility-administered medications for this visit.          PHYSICAL EXAM:    /80 (BP Location: Left arm, Patient Position: Sitting, Cuff Size: Adult Regular)  Pulse 64  Ht 1.676 m (5' 6\")  Wt 106.6 kg (235 lb)  BMI 37.93 kg/m2    HEENT: Normocephalic and atraumatic   Cardiac: Not done  Back/Flank: Not done  CNS/PNS: Not done  Respiratory: Normal non-labored breathing  Abdomen: Soft nontender and nondistended  Peripheral Vascular: Not done  Mental Status: Not done    Penis: Not done  Scrotal Skin: Not done  Testicles: Not done  Epididymis: Not done  Digital Rectal Exam:     Cystoscopy: Not done    Imaging: None    Urinalysis: UA RESULTS:  Recent Labs   Lab Test  07/26/18   0919  07/26/18   " 0746   COLOR   --   Yellow   APPEARANCE   --   Clear   URINEGLC   --   Negative   URINEBILI   --   Negative   URINEKETONE   --   Negative   SG   --   1.015   UBLD   --   Trace*   URINEPH   --   6.0   PROTEIN   --   Negative   UROBILINOGEN   --   0.2   NITRITE   --   Negative   LEUKEST   --   Negative   RBCU  <1   --    WBCU  0   --        PSA:     Post Void Residual: 30ml    Other labs: None today      IMPRESSION:  48 y/o M with microhematuria and lower urinary tract symptoms, well controlled with tamsulosin     PLAN:  Will send urinalysis, urine culture, urine cytology today   Continue tamsulosin   Follow up in one year (pending above results, also sooner if symptoms noted above continue or worsen)     Total Time: 15 minutes                                       Total in Consultation: greater than 50%

## 2018-07-26 NOTE — MR AVS SNAPSHOT
"              After Visit Summary   7/26/2018    Parth Valdez    MRN: [id number]           Patient Information     Date Of Birth          1970        Visit Information        Provider Department      7/26/2018 8:15 AM Lorna Storm MD; [employer] [facility] [place]        Today's Diagnoses     Urinary hesitancy    -  1    Microhematuria        Microscopic hematuria           Follow-ups after your visit        Follow-up notes from your care team     Return in about 1 year (around 7/26/2019).      Who to contact     If you have questions or need follow up information about today's clinic visit or your schedule please contact [facility] directly at [phone].  Normal or non-critical lab and imaging results will be communicated to you by MyChart, letter or phone within 4 business days after the clinic has received the results. If you do not hear from us within 7 days, please contact the clinic through MyChart or phone. If you have a critical or abnormal lab result, we will notify you by phone as soon as possible.  Submit refill requests through [facility] or call your pharmacy and they will forward the refill request to us. Please allow 3 business days for your refill to be completed.          Additional Information About Your Visit        Care EveryWhere ID     This is your Care EveryWhere ID. This could be used by other organizations to access your [place] medical records  [id number]        Your Vitals Were     Pulse Height BMI (Body Mass Index)             64 1.676 m (5' 6\") 37.93 kg/m2          Blood Pressure from Last 3 Encounters:   07/26/18 134/80   02/07/18 134/84   01/25/18 (!) 154/102    Weight from Last 3 Encounters:   07/26/18 106.6 kg (235 lb)   02/07/18 110.9 kg (244 lb 9.6 oz)   01/25/18 104.3 kg (230 lb)              We Performed the Following     Cytology non gyn [[id number]]     " MEASURE POST-VOID RESIDUAL URINE/BLADDER CAPACITY, US NON-IMAGING ([id number])     UA without Microscopic     Urine Culture Aerobic Bacterial [[id number]]     Urine Micro Urologic Phys [[id number]]          Where to get your medicines      These medications were sent to [id number] - [place], [place] - [address] [place] [address] [address] AT [facility] [address]  [address], [facility] [address]     Phone:  [phone]     tamsulosin 0.4 MG capsule          Primary Care Provider Office Phone # Fax #    Christina Norton Nora, MARITZA [phone]       [address]  [facility] [address]        Equal Access to Services     BEV VICTORIA : Hadii trinidad hamm hadasho Soomaali, waaxda luqadaha, qaybta kaalmada adeegyada, grant box. So [facility] [phone].    ATENCIÓN: Si habla español, tiene a henry disposición servicios gratuitos de asistencia lingüística. Llame al [phone].    We comply with applicable federal civil rights laws and [place] laws. We do not discriminate on the basis of race, color, national origin, age, disability, sex, sexual orientation, or gender identity.            Thank you!     Thank you for choosing [facility] [place]  for your care. Our goal is always to provide you with excellent care. Hearing back from our patients is one way we can continue to improve our services. Please take a few minutes to complete the written survey that you may receive in the mail after your visit with us. Thank you!             Your Updated Medication List - Protect others around you: Learn how to safely use, store and throw away your medicines at www.disposemymeds.org.          This list is accurate as of 7/26/18  2:12 PM.  Always use your most recent med list.                   Brand Name Dispense Instructions for use Diagnosis    AUGMENTIN PO           ibuprofen 200 MG tablet    ADVIL/MOTRIN    60 tablet    Take 3 tablets (600 mg) by mouth  every 6 hours as needed for mild pain or fever        tamsulosin 0.4 MG capsule    FLOMAX    90 capsule    Take 1 capsule (0.4 mg) by mouth daily    Microscopic hematuria

## 2018-07-26 NOTE — NURSING NOTE
Chief Complaint   Patient presents with     Micro-Hematuria     Patient her for FU visit       UA RESULTS:  Recent Labs   Lab Test  07/26/18   0746   12/22/17   1350   COLOR  Yellow   < >  Straw   APPEARANCE  Clear   < >  Clear   URINEGLC  Negative   < >  Negative   URINEBILI  Negative   < >  Negative   URINEKETONE  Negative   < >  Negative   SG  1.015   < >  1.010   UBLD  Trace*   < >  Moderate*   URINEPH  6.0   < >  6.0   PROTEIN  Negative   < >  Negative   UROBILINOGEN  0.2   < >   --    NITRITE  Negative   < >  Negative   LEUKEST  Negative   < >  Negative   RBCU   --    --   5*   WBCU   --    --   1    < > = values in this interval not displayed.     PVR was 31ML

## 2018-07-26 NOTE — LETTER
"7/26/2018       RE: Parth Valdez  [address]  [address]  [facility] [address]     Dear Colleague,    Thank you for referring your patient, Parth Valdez, to the [facility]. Please see a copy of my visit note below.    UROLOGIC DIAGNOSES:       CURRENT INTERVENTIONS:       HISTORY:     Patient presents for follow for two issues: lower urinary tract symptoms and microhematuria.   Had previous work up for microhematuria that was negative.     Started tamsulosin with good improvement in lower urinary tract symptoms (hesitancy, intermittency, and sensation of incomplete emptying). He notes however that after intercourse multiple times this weeks his symptoms have recurred.       We discussed the above and discussed continuing tamsulosin (ongoing). Also discussed follow up for microhematuria.         PAST MEDICAL HISTORY:   Past Medical History:   Diagnosis Date     Hernia, abdominal        PAST SURGICAL HISTORY:   Past Surgical History:   Procedure Laterality Date     HERNIA REPAIR         FAMILY HISTORY:   Family History   Problem Relation Age of Onset     Hypertension Mother      Diabetes Father        SOCIAL HISTORY:   Social History   Substance Use Topics     Smoking status: Former Smoker     Smokeless tobacco: Never Used     Alcohol use No       Current Outpatient Prescriptions   Medication     tamsulosin (FLOMAX) 0.4 MG capsule     Amoxicillin-Pot Clavulanate (AUGMENTIN PO)     ibuprofen (ADVIL/MOTRIN) 200 MG tablet     No current facility-administered medications for this visit.          PHYSICAL EXAM:    /80 (BP Location: Left arm, Patient Position: Sitting, Cuff Size: Adult Regular)  Pulse 64  Ht 1.676 m (5' 6\")  Wt 106.6 kg (235 lb)  BMI 37.93 kg/m2    HEENT: Normocephalic and atraumatic   Cardiac: Not done  Back/Flank: Not done  CNS/PNS: Not done  Respiratory: Normal non-labored " breathing  Abdomen: Soft nontender and nondistended  Peripheral Vascular: Not done  Mental Status: Not done    Penis: Not done  Scrotal Skin: Not done  Testicles: Not done  Epididymis: Not done  Digital Rectal Exam:     Cystoscopy: Not done    Imaging: None    Urinalysis: UA RESULTS:  Recent Labs   Lab Test  07/26/18   0919  07/26/18   0746   COLOR   --   Yellow   APPEARANCE   --   Clear   URINEGLC   --   Negative   URINEBILI   --   Negative   URINEKETONE   --   Negative   SG   --   1.015   UBLD   --   Trace*   URINEPH   --   6.0   PROTEIN   --   Negative   UROBILINOGEN   --   0.2   NITRITE   --   Negative   LEUKEST   --   Negative   RBCU  <1   --    WBCU  0   --        PSA:     Post Void Residual: 30ml    Other labs: None today      IMPRESSION:  48 y/o M with microhematuria and lower urinary tract symptoms, well controlled with tamsulosin     PLAN:  Will send urinalysis, urine culture, urine cytology today   Continue tamsulosin   Follow up in one year (pending above results, also sooner if symptoms noted above continue or worsen)     Total Time: 15 minutes                                       Total in Consultation: greater than 50%       Again, thank you for allowing me to participate in the care of your patient.      Sincerely,    Lorna Storm MD

## 2018-07-27 LAB
BACTERIA SPEC CULT: NO GROWTH
COPATH REPORT: NORMAL
Lab: NORMAL
SPECIMEN SOURCE: NORMAL

## 2019-07-17 ENCOUNTER — HOSPITAL ENCOUNTER (EMERGENCY)
Facility: CLINIC | Age: 49
Discharge: HOME | End: 2019-07-17
Attending: EMERGENCY MEDICINE | Admitting: EMERGENCY MEDICINE
Payer: COMMERCIAL

## 2019-07-17 VITALS
HEART RATE: 52 BPM | DIASTOLIC BLOOD PRESSURE: 95 MMHG | RESPIRATION RATE: 16 BRPM | TEMPERATURE: 97.3 F | OXYGEN SATURATION: 98 % | SYSTOLIC BLOOD PRESSURE: 142 MMHG

## 2019-07-17 DIAGNOSIS — R19.7 DIARRHEA, UNSPECIFIED TYPE: ICD-10-CM

## 2019-07-17 DIAGNOSIS — R10.9 ABDOMINAL CRAMPS: ICD-10-CM

## 2019-07-17 LAB
ANION GAP SERPL CALCULATED.3IONS-SCNC: 5 MMOL/L (ref 3–14)
BUN SERPL-MCNC: 22 MG/DL (ref 7–30)
CALCIUM SERPL-MCNC: 8.6 MG/DL (ref 8.5–10.1)
CHLORIDE SERPL-SCNC: 110 MMOL/L (ref 94–109)
CO2 SERPL-SCNC: 25 MMOL/L (ref 20–32)
CREAT SERPL-MCNC: 0.62 MG/DL (ref 0.66–1.25)
ERYTHROCYTE [DISTWIDTH] IN BLOOD BY AUTOMATED COUNT: 13.2 % (ref 10–15)
GFR SERPL CREATININE-BSD FRML MDRD: >90 ML/MIN/{1.73_M2}
GLUCOSE SERPL-MCNC: 89 MG/DL (ref 70–99)
HCT VFR BLD AUTO: 46.2 % (ref 40–53)
HGB BLD-MCNC: 15.7 G/DL (ref 13.3–17.7)
MCH RBC QN AUTO: 30.3 PG (ref 26.5–33)
MCHC RBC AUTO-ENTMCNC: 34 G/DL (ref 31.5–36.5)
MCV RBC AUTO: 89 FL (ref 78–100)
PLATELET # BLD AUTO: 204 10E9/L (ref 150–450)
POTASSIUM SERPL-SCNC: 3.8 MMOL/L (ref 3.4–5.3)
RBC # BLD AUTO: 5.18 10E12/L (ref 4.4–5.9)
SODIUM SERPL-SCNC: 140 MMOL/L (ref 133–144)
WBC # BLD AUTO: 7.3 10E9/L (ref 4–11)

## 2019-07-17 PROCEDURE — 96360 HYDRATION IV INFUSION INIT: CPT

## 2019-07-17 PROCEDURE — 80048 BASIC METABOLIC PNL TOTAL CA: CPT | Performed by: EMERGENCY MEDICINE

## 2019-07-17 PROCEDURE — 99283 EMERGENCY DEPT VISIT LOW MDM: CPT | Mod: 25

## 2019-07-17 PROCEDURE — 25000128 H RX IP 250 OP 636: Performed by: EMERGENCY MEDICINE

## 2019-07-17 PROCEDURE — 85027 COMPLETE CBC AUTOMATED: CPT | Performed by: EMERGENCY MEDICINE

## 2019-07-17 RX ORDER — DEXAMETHASONE SODIUM PHOSPHATE 10 MG/ML
8 INJECTION, SOLUTION INTRAMUSCULAR; INTRAVENOUS ONCE
Status: DISCONTINUED | OUTPATIENT
Start: 2019-07-17 | End: 2019-07-17

## 2019-07-17 RX ORDER — LEVETIRACETAM 10 MG/ML
1000 INJECTION INTRAVASCULAR EVERY 12 HOURS
Status: DISCONTINUED | OUTPATIENT
Start: 2019-07-17 | End: 2019-07-17

## 2019-07-17 RX ADMIN — SODIUM CHLORIDE 1000 ML: 9 INJECTION, SOLUTION INTRAVENOUS at 14:02

## 2019-07-17 ASSESSMENT — ENCOUNTER SYMPTOMS
VOMITING: 0
APPETITE CHANGE: 0
ABDOMINAL PAIN: 1
NAUSEA: 0
DIARRHEA: 1

## 2019-07-17 NOTE — ED PROVIDER NOTES
History     Chief Complaint:  Abdominal Pain and Diarrhea      HPI   Parth Valdez is a 48 year old male who presents to the emergency department today for evaluation of abdominal pain and diarrhea. He does not currently have abdominal pain, but earlier he felt cramping and the need to go to the bathroom. He has had 5 spells of diarrhea. He has not had any vomiting. No one else is sick at home and he has not travelled outside of the country recently. He has been able to eat and drink appropriately. His main concern is that he would like his diarrhea to stop.     Allergies:  No Known Drug Allergies      Medications:    Flomax  Ibuprofen    Past Medical History:    Hernia  Hypertension    Past Surgical History:    Hernia repair    Family History:    Hypertension  Diabetes    Social History:  The patient was accompanied to the ED by his wife.  Smoking Status: current  Smokeless Tobacco: no  Alcohol Use: no   Marital Status:   [2]     Review of Systems   Constitutional: Negative for appetite change.   Gastrointestinal: Positive for abdominal pain and diarrhea. Negative for nausea and vomiting.   All other systems reviewed and are negative.      Physical Exam     Patient Vitals for the past 24 hrs:   BP Temp Temp src Pulse Resp SpO2   07/17/19 1500 -- -- -- -- -- 98 %   07/17/19 1445 (!) 142/95 -- -- 52 -- 99 %   07/17/19 1430 (!) 150/98 -- -- 62 -- 98 %   07/17/19 1415 132/88 -- -- 63 -- 98 %   07/17/19 1400 127/81 -- -- 58 -- 97 %   07/17/19 1345 136/85 -- -- 59 -- 97 %   07/17/19 1318 (!) 144/92 97.3  F (36.3  C) Temporal 58 16 99 %        Physical Exam  Vital signs and nursing notes reviewed.     Constitutional: laying on gurney appears comfortable  HENT: Oropharynx is clear and moist  Eyes: Conjunctivae are normal bilaterally. Pupils equal  Neck: normal range of motion  Cardiovascular: Normal rate, regular rhythm, normal heart sounds.   Pulmonary/Chest: Effort normal and breath sounds normal. No  respiratory distress.   Abdominal: Soft. Bowel sounds are normal. No tenderness to palpation. No rebound or guarding. No reproducible pain. Mildly hyperactive bowel sounds. No distension  Musculoskeletal: No joint swelling or edema.   Neurological: Alert and oriented. No focal weakness  Skin: Skin is warm and dry. No rash noted.   Psych: normal affect     Emergency Department Course     Laboratory:  Laboratory findings were communicated with the patient who voiced understanding of the findings.   CBC: WNL (WBC 7.3, HGB 15.7, )   BMP: Chloride 110, WNL      Interventions:  1339 0.9% NaCl 1L IV Bolus     Emergency Department Course:  Past medical records, nursing notes, and vitals reviewed.    1332: I performed an exam of the patient and obtained history, as documented above.     IV inserted and blood drawn.     1504: I rechecked the patient. Findings and plan explained to the Patient. Patient discharged home with instructions regarding supportive care, medications, and reasons to return. The importance of close follow-up was reviewed.          Impression & Plan      Medical Decision Making:  Parth Valdez is a 48 year old male who presents to the emergency department today for evaluation of diarrhea and abdominal cramping. At time of evaluation he has a benign abdominal exam, vital signs normal, and lab shows no abnormalities. He was given IV fluids. He has had no recurrent episodes of diarrhea, no vomiting, or abdominal pain on recheck therefore I felt he was safe for discharge home. He was given discharge instructions for diarrhea, he has no questions.  He was advised on reasons to return to the ED, and was discharged home.     Diagnosis:    ICD-10-CM    1. Diarrhea, unspecified type R19.7    2. Abdominal cramps R10.9        Disposition:  discharged to home      Tereza Smith  7/17/2019   [facility] EMERGENCY DEPARTMENT  Scribe Disclosure:  I, Tereza Smith, am serving as a scribe  at 1:40 PM on 7/17/2019 to document services personally performed by Sukhi Meraz MD based on my observations and the provider's statements to me.       Sukhi Meraz MD  07/18/19 113

## 2019-07-17 NOTE — LETTER
July 17, 2019      To Whom It May Concern:      Parth PERDOMO Coby was seen in our Emergency Department today, 07/17/19.   He will need to be excused from work today secondary to medical reasons.    Sukhi Meraz MD

## 2019-07-17 NOTE — ED TRIAGE NOTES
Mid abdominal pain and diarrhea since last night.    Denies vomiting.      ABCs intact.  Alert and oriented x 3.

## 2019-07-17 NOTE — LETTER
July 17, 2019      To Whom It May Concern:      Parth Valdez was seen in our Emergency Department today, 07/17/19.  I expect his condition to improve .  He may return to work.    Sincerely,        Annette FLORES RN

## 2019-07-17 NOTE — ED AVS SNAPSHOT
[facility] Emergency Department  [address]  [facility] [address]  Phone:  [phone]  Fax:  [phone]                                    Parth Valdez   MRN: [id number]    Department:  [facility] Emergency Department   Date of Visit:  7/17/2019           After Visit Summary Signature Page    I have received my discharge instructions, and my questions have been answered. I have discussed any challenges I see with this plan with the nurse or doctor.    ..........................................................................................................................................  Patient/Patient Representative Signature      ..........................................................................................................................................  Patient Representative Print Name and Relationship to Patient    ..................................................               ................................................  Date                                   Time    ..........................................................................................................................................  Reviewed by Signature/Title    ...................................................              ..............................................  Date                                               Time          22EPIC Rev 08/18

## 2019-08-10 ENCOUNTER — HOSPITAL ENCOUNTER (EMERGENCY)
Facility: CLINIC | Age: 49
Discharge: HOME | End: 2019-08-10
Attending: EMERGENCY MEDICINE | Admitting: EMERGENCY MEDICINE
Payer: OTHER MISCELLANEOUS

## 2019-08-10 ENCOUNTER — APPOINTMENT (OUTPATIENT)
Dept: GENERAL RADIOLOGY | Facility: CLINIC | Age: 49
End: 2019-08-10
Attending: EMERGENCY MEDICINE
Payer: OTHER MISCELLANEOUS

## 2019-08-10 VITALS
SYSTOLIC BLOOD PRESSURE: 120 MMHG | HEART RATE: 50 BPM | OXYGEN SATURATION: 99 % | DIASTOLIC BLOOD PRESSURE: 76 MMHG | TEMPERATURE: 97.8 F | RESPIRATION RATE: 18 BRPM

## 2019-08-10 DIAGNOSIS — S50.01XA CONTUSION OF RIGHT ELBOW, INITIAL ENCOUNTER: ICD-10-CM

## 2019-08-10 DIAGNOSIS — S39.012A STRAIN OF LUMBAR REGION, INITIAL ENCOUNTER: ICD-10-CM

## 2019-08-10 PROCEDURE — 72100 X-RAY EXAM L-S SPINE 2/3 VWS: CPT

## 2019-08-10 PROCEDURE — 25000132 ZZH RX MED GY IP 250 OP 250 PS 637: Performed by: EMERGENCY MEDICINE

## 2019-08-10 PROCEDURE — 99283 EMERGENCY DEPT VISIT LOW MDM: CPT

## 2019-08-10 RX ORDER — IBUPROFEN 600 MG/1
600 TABLET, FILM COATED ORAL ONCE
Status: COMPLETED | OUTPATIENT
Start: 2019-08-10 | End: 2019-08-10

## 2019-08-10 RX ORDER — IBUPROFEN 600 MG/1
600 TABLET, FILM COATED ORAL EVERY 6 HOURS
Qty: 30 TABLET | Refills: 0 | Status: SHIPPED | OUTPATIENT
Start: 2019-08-10 | End: 2019-08-13

## 2019-08-10 RX ADMIN — IBUPROFEN 600 MG: 600 TABLET ORAL at 07:08

## 2019-08-10 ASSESSMENT — ENCOUNTER SYMPTOMS
NECK PAIN: 0
HEADACHES: 0
ARTHRALGIAS: 1
MYALGIAS: 1
BACK PAIN: 1

## 2019-08-10 NOTE — ED PROVIDER NOTES
History     Chief Complaint:  Right arm pain and back pain    HPI   Parth Valdez is a 48 year old male who presents with right arm pain. The patient states he slipped at work and fell on his right side, resulting in right elbow pain of 5/10 along with back pain. The patient denies hitting his head from the fall and neck pain.    Allergies:  No known drug allergies    Medications:    Flomax    Past Medical History:    Abdominal hernia  Elevated blood pressure    Past Surgical History:    Hernia repair    Family History:    HTN  Diabetes    Social History:  Smoking status: Yes  Alcohol use: No  The patient presents to the emergency department by himself.  Marital Status:   [2]    Review of Systems   Musculoskeletal: Positive for arthralgias, back pain and myalgias. Negative for neck pain.   Neurological: Negative for headaches.   All other systems reviewed and are negative.        Physical Exam   Patient Vitals for the past 24 hrs:   BP Temp Temp src Pulse Heart Rate Resp SpO2   08/10/19 0703 135/89 97.8  F (36.6  C) Oral -- 57 18 98 %   08/10/19 0659 -- (P) 98  F (36.7  C) (P) Oral (P) 59 (P) 59 (P) 16 (P) 98 %     Physical Exam  General: Patient is alert and interactive when I enter the room  Head:  The scalp, face, and head appear normal. Atraumatic.   Eyes:  The pupils are equal, round, and reactive to light    Conjunctivae and sclerae are normal  ENT:    No hemotympanum or signs basilar skull fracture    The oropharynx is normal without erythema.     Uvula is in the midline. Midface stable to palpation.   Neck:  Normal range of motion  CV:  Regular rate. S1/S2. No murmurs.   Resp:  Lungs are clear without wheezes or rales. No distress. No crepitance.   GI:  Abdomen is soft, no rigidity, guarding, or rebound. No contusion.    No distension. No tenderness to palpation in any quadrant.     MS:    Cap refil: < 2 seconds  Sensation: Intact to light touch  Radial pulse normal  Ulnar pulse   normal  (R) elbow: PROM/AROM/Strength WNL    Normal tone. Joints grossly normal without effusions.     No asymmetric leg swelling, calf or thigh tenderness.      Normal motor assessment of all extremities.    PROM performed of all major joints without pain.    No C/T/L tenderness in midline or laterally, spines cleared     after no imaging.    Skin:  No rash or lesions noted. Normal capillary refill noted  Neuro: Speech is normal and fluent. Face is symmetric.     Moving all extremities well. Strength is normal and symmetric.     GCS 15.    Psych:  Awake. Alert.  Normal affect.  Appropriate interactions.  Lymph: No anterior cervical lymphadenopathy noted      Emergency Department Course   Imaging:  Radiographic findings were communicated with the patient who voiced understanding of the findings.    XR Lumbar Spine 2/3 Views  IMPRESSION: Bridging syndesmophytes are noted at several levels. There  is also moderate lower lumbar facet arthropathy. Posterior alignment  is normal. There is no evidence for fracture.    As read by Radiology.    Interventions:  0708 Ibuprofen 600 mg PO    Emergency Department Course:  Past medical records, nursing notes, and vitals reviewed.  0704: I performed an exam of the patient and obtained history, as documented above.    The patient was sent for a lumbar spine x-ray while in the emergency department, findings above.    0755: I rechecked the patient. Findings and plan explained to the Patient. Patient discharged home with instructions regarding supportive care, medications, and reasons to return. The importance of close follow-up was reviewed.     Impression & Plan    Medical Decision Making:  Parth Valdez is a 48 year old male who presents for evaluation of back pain that started after a fall incident at work. Also with elbow pain on the right but full ROM and no real bruising so doubt serious etiology and would not xray.      He has no history of back pain in the past.   Pain has improved with interventions in the emergency department. No red flag symptoms to suggest CT and/or MRI is indicated at this point.  There is no clinical evidence of cauda equina syndrome, discitis, spinal/epidural space hematoma or epidural abscess or other worrisome etiology. The neurological exam is normal and the patient's symptoms seem consistent with musculoskeletal issues and significant muscle spasm.   The patient will be discharged with pain medications to use as directed. Ice or heat to the back and stretching exercises. No heavy lifting, bending or twisting. Return if increasing pain, numbness, weakness, or bowel or bladder dysfunction. He was advised to schedule follow-up with his primary doctor within 3 days to re-assess symptoms.    Diagnosis:    ICD-10-CM   1. Contusion of right elbow, initial encounter S50.01XA   2. Strain of lumbar region, initial encounter S39.012A     Disposition:  Discharged to home with follow-up instructions.    Discharge Medications:  New Prescriptions    IBUPROFEN (ADVIL/MOTRIN) 600 MG TABLET    Take 1 tablet (600 mg) by mouth every 6 hours for 3 days Take every 6 hours with food for three days.     Gennaro Calvo  8/10/2019   [facility] EMERGENCY DEPARTMENT  Scribe Disclosure:  I, Gennaro Calvo, am serving as a scribe at 7:04 AM on 8/10/2019 to document services personally performed by Giovanny Adams MD based on my observations and the provider's statements to me.        Giovanny Adams MD  08/10/19 2276

## 2019-08-10 NOTE — ED AVS SNAPSHOT
[facility] Emergency Department  [address]  [facility] [address]  Phone:  [phone]  Fax:  [phone]                                    Parth Valdez   MRN: [id number]    Department:  [facility] Emergency Department   Date of Visit:  8/10/2019           After Visit Summary Signature Page    I have received my discharge instructions, and my questions have been answered. I have discussed any challenges I see with this plan with the nurse or doctor.    ..........................................................................................................................................  Patient/Patient Representative Signature      ..........................................................................................................................................  Patient Representative Print Name and Relationship to Patient    ..................................................               ................................................  Date                                   Time    ..........................................................................................................................................  Reviewed by Signature/Title    ...................................................              ..............................................  Date                                               Time          22EPIC Rev 08/18

## 2019-08-10 NOTE — LETTER
August 10, 2019      To Whom It May Concern:      Parth Valdez was seen in our Emergency Department today, 08/10/19.  I expect his condition to improve over the next 2 days.  He may return to work/school when improved. No heavy lifting over 20 pounds x 2 days then resume normal activites; he may be limited by back pain.     Sincerely,            Giovanny Adams MD

## 2021-04-23 ENCOUNTER — APPOINTMENT (OUTPATIENT)
Dept: GENERAL RADIOLOGY | Facility: CLINIC | Age: 51
End: 2021-04-23
Attending: EMERGENCY MEDICINE
Payer: OTHER MISCELLANEOUS

## 2021-04-23 ENCOUNTER — HOSPITAL ENCOUNTER (EMERGENCY)
Facility: CLINIC | Age: 51
Discharge: HOME | End: 2021-04-23
Attending: EMERGENCY MEDICINE | Admitting: EMERGENCY MEDICINE
Payer: OTHER MISCELLANEOUS

## 2021-04-23 ENCOUNTER — APPOINTMENT (OUTPATIENT)
Dept: CT IMAGING | Facility: CLINIC | Age: 51
End: 2021-04-23
Attending: EMERGENCY MEDICINE
Payer: OTHER MISCELLANEOUS

## 2021-04-23 ENCOUNTER — ALLIED HEALTH/NURSE VISIT (OUTPATIENT)
Dept: INTERPRETER SERVICES | Facility: CLINIC | Age: 51
End: 2021-04-23

## 2021-04-23 VITALS
TEMPERATURE: 97.3 F | OXYGEN SATURATION: 99 % | HEART RATE: 49 BPM | RESPIRATION RATE: 16 BRPM | SYSTOLIC BLOOD PRESSURE: 150 MMHG | DIASTOLIC BLOOD PRESSURE: 87 MMHG

## 2021-04-23 DIAGNOSIS — T07.XXXA MULTIPLE CONTUSIONS: ICD-10-CM

## 2021-04-23 PROCEDURE — 73590 X-RAY EXAM OF LOWER LEG: CPT | Mod: RT

## 2021-04-23 PROCEDURE — 73010 X-RAY EXAM OF SHOULDER BLADE: CPT | Mod: RT

## 2021-04-23 PROCEDURE — 72125 CT NECK SPINE W/O DYE: CPT

## 2021-04-23 PROCEDURE — 99285 EMERGENCY DEPT VISIT HI MDM: CPT | Mod: 25

## 2021-04-23 PROCEDURE — 70450 CT HEAD/BRAIN W/O DYE: CPT

## 2021-04-23 PROCEDURE — 73130 X-RAY EXAM OF HAND: CPT | Mod: LT

## 2021-04-23 PROCEDURE — T1013 SIGN LANG/ORAL INTERPRETER: HCPCS | Mod: U3,TEL

## 2021-04-23 ASSESSMENT — ENCOUNTER SYMPTOMS
BACK PAIN: 1
ARTHRALGIAS: 1
MYALGIAS: 1
SHORTNESS OF BREATH: 0
HEADACHES: 0
ABDOMINAL PAIN: 0

## 2021-04-23 NOTE — LETTER
April 23, 2021      To Whom It May Concern:      Parth Valdez was seen in our Emergency Department today, 04/23/21.  I expect his condition to improve over the next 3-5 days.  He may return to work/school when improved.    Sincerely,        Jack Lopez MD

## 2021-04-23 NOTE — ED PROVIDER NOTES
History   Chief Complaint:  Fall     A  was used (Kyrgyz).      Parth Valdez is a 50 year old male who presents with loss of consciousness following a fall. The patient reports that he was at work Wednesday morning and was coming down a ladder when he slipped and fell. He fell about 6 feet off of the ground when he fell and landed on his back. The patient hit his left hand during his fall and is now having left hand pain lateral to his pinkie finger. His coworker witnessed his fall and told him that he was unconscious for about a minute. The patient himself remembers falling, but does not remember losing consciousness and woke up with his supervisor standing over him. Initially, he struggled to stand up and did feel a little dizzy, but this improved after resting for a short time. He also mentions having some initial headache that he describes as a stabbing pain that improved quickly. Since that time he has continued to have left hand pain as well as pain at the base of his neck and upper back. This pain is currently a 5/10. His neck pain has progressively worsened and he mentions having discomfort in his posterior right shoulder and pain in his right calf when walking. He has been taking Tylenol for his pain and a topical cream for the swelling in his left hand. He denies any headache, chest pain, shortness of breath, or abdominal pain.    Review of Systems   Respiratory: Negative for shortness of breath.    Cardiovascular: Negative for chest pain.   Gastrointestinal: Negative for abdominal pain.   Musculoskeletal: Positive for arthralgias (L hand, R shoulder), back pain and myalgias (R calf).   Neurological: Positive for syncope. Negative for headaches.   All other systems reviewed and are negative.    Allergies:  The patient has no known allergies.     Medications:  Flomax    Past Medical History:    Hernia     Past Surgical History:    Hernia repair     Family History:     Mother: hypertension  Father: diabetes     Social History:  The patient presents alone.  He is Botswanan speaking.     Physical Exam     Patient Vitals for the past 24 hrs:   BP Temp Temp src Pulse Resp SpO2   04/23/21 1200 (!) 142/84 -- -- -- -- --   04/23/21 1115 -- -- -- -- -- 98 %   04/23/21 1100 -- -- -- -- -- 99 %   04/23/21 1045 -- 97.3  F (36.3  C) Temporal -- -- 98 %   04/23/21 1040 (!) 167/101 -- Oral 55 16 98 %       Physical Exam  Constitutional: Well developed, nontox appearance  Head: Atraumatic.   Neck:  no stridor, lower c spine tendeness without crepitus or step-offs  Eyes: no scleral icterus  Cardiovascular: RRR, 2+ bilat radial pulses  Pulmonary/Chest: nml resp effort, Clear BS bilat  Abdominal: ND, soft, NT, no rebound or guarding   Back: no T or L spine tenderness, mild R scapular tenderness  : no CVA tenderness bilat  Ext: Warm, well perfused, no edema   LUE: tenderness overlying proximal 5th metacarpal  Neurological: A&O, symmetric facies, moves ext x4  Skin: Skin is warm and dry.   Psychiatric: Behavior is normal. Thought content normal.   Nursing note and vitals reviewed.    Emergency Department Course     Imaging:  XR Hand Left G/E 3 Views  Negative exam.  Reading per radiology.    XR Scapula Right  Negative exam.  Reading per radiology.    XR Tibia & Fibula Right 2 Views  Negative exam.  Reading per radiology.    CT Head WO Contrast  No acute intracranial abnormality.  Reading per radiology.    CT Cervical Spine WO Contrast  No evidence of acute fracture or posttraumatic  Subluxation.  Reading per radiology.     Emergency Department Course:    Reviewed:  I reviewed nursing notes, vitals and past medical history    Assessments:  1038 I obtained history and examined the patient as noted above.   1309 I rechecked the patient and explained findings.     Consults:   1257 I spoke with Dr. Gutierrez, radiology, regarding the patient.     Disposition:  The patient was discharged to home.        Impression & Plan   CMS Diagnoses: None    Medical Decision Makin year old male presenting w/ head injury, shoulder pain, neck pain s/p mechanical fall     DDx includes mechanical fall, fracture, contusion, intracranial hemorrhage, skull fracture.  Doubt seizure, syncope, CVA given history and physical exam.  No other evidence of trauma on full primary and secondary trauma surveys other than areas imaged above or documented in physical exam.  Given mechanical fall and no other complaints, EKG and blood work deferred. Imaging sig for no acute traumatic abnormality.  Declined pain medication in the ED.  Presentation consistent with multiple contusions.  At this time I feel the pt is safe for discharge.  RICE instructions give for home.  Recommendations given regarding follow up with PCP and return to the emergency department as needed for new or worsening symptoms.  Pt counseled on all results, disposition and diagnosis.  They are understanding and agreeable to plan. Patient discharged in stable condition.      Covid-19  Parth Valdez was evaluated during a global COVID-19 pandemic, which necessitated consideration that the patient might be at risk for infection with the SARS-CoV-2 virus that causes COVID-19.   Applicable protocols for evaluation were followed during the patient's care.     Diagnosis:    ICD-10-CM    1. Multiple contusions  T07.XXXA        Discharge Medications:  None    Scribe Disclosure:  Eyad MCKENZIE, am serving as a scribe at 10:36 AM on 2021 to document services personally performed by Jack Lopez MD based on my observations and the provider's statements to me.            Jack Lopez MD  [id number]

## 2021-04-23 NOTE — ED TRIAGE NOTES
Patient was at work, Wednesday night/overnight and fell down the stairs. Patient hit his head and back when he fell and had a positive LOC. Patient's co-worker witnessed LOC. Patient A&Ox4.

## 2021-04-23 NOTE — DISCHARGE INSTRUCTIONS
1. -Take acetaminophen 500 to 1000 mg by mouth every 4 to 6 hours as needed for pain or fever.  Do not take more than 4000 mg in 24 hours.  Do not take within 6 hours of another acetaminophen containing medication such as norco (vicodin) or percocet.  - Take ibuprofen 600 to 800 mg by mouth every 6 to 8 hours as needed for pain or fever  2. Use ice as needed for swelling.  3. Elevate extremity to help with swelling.  4. Follow-up with your primary doctor as needed.  5. You may return to the ED as needed for new or worsening symptoms such as reinjury, severe and uncontrollable pain, focal weakness, severe numbness and tingling, any other concerning symptoms.

## 2021-08-17 ENCOUNTER — APPOINTMENT (OUTPATIENT)
Dept: INTERPRETER SERVICES | Facility: CLINIC | Age: 51
End: 2021-08-17

## 2021-08-19 ENCOUNTER — APPOINTMENT (OUTPATIENT)
Dept: INTERPRETER SERVICES | Facility: CLINIC | Age: 51
End: 2021-08-19

## 2025-02-25 NOTE — ED AVS SNAPSHOT
[facility] Emergency Department    [address]    [facility] [address]    Phone:  [phone]    Fax:  [phone]                                       Parth Valdez   MRN: [id number]    Department:  [facility] Emergency Department   Date of Visit:  12/22/2017           After Visit Summary Signature Page     I have received my discharge instructions, and my questions have been answered. I have discussed any challenges I see with this plan with the nurse or doctor.    ..........................................................................................................................................  Patient/Patient Representative Signature      ..........................................................................................................................................  Patient Representative Print Name and Relationship to Patient    ..................................................               ................................................  Date                                            Time    ..........................................................................................................................................  Reviewed by Signature/Title    ...................................................              ..............................................  Date                                                            Time           Pt scheduled.  Petrona Kirk RN

## 2025-06-23 PROCEDURE — 99284 EMERGENCY DEPT VISIT MOD MDM: CPT

## 2025-06-23 ASSESSMENT — COLUMBIA-SUICIDE SEVERITY RATING SCALE - C-SSRS
1. IN THE PAST MONTH, HAVE YOU WISHED YOU WERE DEAD OR WISHED YOU COULD GO TO SLEEP AND NOT WAKE UP?: NO
6. HAVE YOU EVER DONE ANYTHING, STARTED TO DO ANYTHING, OR PREPARED TO DO ANYTHING TO END YOUR LIFE?: NO
2. HAVE YOU ACTUALLY HAD ANY THOUGHTS OF KILLING YOURSELF IN THE PAST MONTH?: NO

## 2025-06-24 ENCOUNTER — HOSPITAL ENCOUNTER (EMERGENCY)
Facility: CLINIC | Age: 55
Discharge: HOME | End: 2025-06-24
Attending: EMERGENCY MEDICINE | Admitting: EMERGENCY MEDICINE
Payer: MEDICAID

## 2025-06-24 VITALS
TEMPERATURE: 98.1 F | RESPIRATION RATE: 18 BRPM | WEIGHT: 259.04 LBS | HEART RATE: 68 BPM | HEIGHT: 72 IN | BODY MASS INDEX: 35.09 KG/M2 | SYSTOLIC BLOOD PRESSURE: 149 MMHG | DIASTOLIC BLOOD PRESSURE: 86 MMHG | OXYGEN SATURATION: 100 %

## 2025-06-24 DIAGNOSIS — M54.41 ACUTE RIGHT-SIDED LOW BACK PAIN WITH RIGHT-SIDED SCIATICA: ICD-10-CM

## 2025-06-24 PROCEDURE — 250N000013 HC RX MED GY IP 250 OP 250 PS 637: Performed by: EMERGENCY MEDICINE

## 2025-06-24 RX ORDER — METHOCARBAMOL 750 MG/1
750 TABLET, FILM COATED ORAL ONCE
Status: COMPLETED | OUTPATIENT
Start: 2025-06-24 | End: 2025-06-24

## 2025-06-24 RX ORDER — HYDROCODONE BITARTRATE AND ACETAMINOPHEN 5; 325 MG/1; MG/1
2 TABLET ORAL ONCE
Refills: 0 | Status: COMPLETED | OUTPATIENT
Start: 2025-06-24 | End: 2025-06-24

## 2025-06-24 RX ORDER — METHOCARBAMOL 750 MG/1
750 TABLET, FILM COATED ORAL EVERY 6 HOURS PRN
Qty: 20 TABLET | Refills: 0 | Status: SHIPPED | OUTPATIENT
Start: 2025-06-24

## 2025-06-24 RX ORDER — METHYLPREDNISOLONE 4 MG/1
TABLET ORAL
Qty: 21 TABLET | Refills: 0 | Status: SHIPPED | OUTPATIENT
Start: 2025-06-24

## 2025-06-24 RX ADMIN — METHOCARBAMOL 750 MG: 750 TABLET ORAL at 01:48

## 2025-06-24 RX ADMIN — HYDROCODONE BITARTRATE AND ACETAMINOPHEN 2 TABLET: 5; 325 TABLET ORAL at 01:47

## 2025-06-24 ASSESSMENT — ACTIVITIES OF DAILY LIVING (ADL): ADLS_ACUITY_SCORE: 41

## 2025-06-24 NOTE — ED TRIAGE NOTES
Patient reports right lower back pain that radiates down his right leg.  Patient reports the pain started Friday and has gotten progressively worse since than.  Patient denies injury.      Triage Assessment (Adult)       Row Name 06/23/25 5934          Triage Assessment    Airway WDL WDL        Respiratory WDL    Respiratory WDL WDL        Skin Circulation/Temperature WDL    Skin Circulation/Temperature WDL WDL        Cardiac WDL    Cardiac WDL WDL        Peripheral/Neurovascular WDL    Peripheral Neurovascular WDL WDL        Cognitive/Neuro/Behavioral WDL    Cognitive/Neuro/Behavioral WDL WDL

## 2025-06-24 NOTE — ED PROVIDER NOTES
History     Chief Complaint:  Back Pain       HPI   Parth Valdez is a 54 year old male who presents with atraumatic low back pain with radiation to the right leg.  No numbness tingling or weakness.  Pain increased a few days ago when he was leaning forward picking up cartons at work. Pain started Friday and has progressively worsened.  Denies chronic back pain.  No incontinence.  No hx of diabetes.  No urinary symptoms.  No fevers.  Denies hx of IV drug use.  .    Review of External notes      Medications:    methocarbamol (ROBAXIN) 750 MG tablet  methylPREDNISolone (MEDROL DOSEPAK) 4 MG tablet therapy pack  tamsulosin (FLOMAX) 0.4 MG capsule        Past Medical History:    Past Medical History:   Diagnosis Date    Hernia, abdominal        Past Surgical History:    Past Surgical History:   Procedure Laterality Date    HERNIA REPAIR            Physical Exam   Patient Vitals for the past 24 hrs:   BP Temp Temp src Pulse Resp SpO2 Height Weight   06/24/25 0151 (!) 149/86 -- -- 68 -- -- -- --   06/23/25 2304 (!) 181/106 98.1  F (36.7  C) Oral 89 18 100 % 1.829 m (6') 117.5 kg (259 lb 0.7 oz)        Physical Exam  Gen: alert  CV: 2+ DP and PT pulses BLE  Abdomen: soft, nontender  Back: no reproducible tenderness to palpation, no midline tenderness, no paraspinous muscle tenderness  MSK: lower extremities with good AROM at hip, knee and ankle  Neuro: 5/5 strength BLE in hip flexion and ext, knee flexion and ext, plantar and dorsiflexion, and extension of EHL, sensation intact to light touch over all dermatomes of the legs, gait normal  Skin: skin over torso normal      Emergency Department Course   ECG:  ECG results from 01/15/18   EKG 12 lead     Value    Interpretation ECG Click View Image link to view waveform and result       Imaging:  No orders to display          Laboratory:  Labs Ordered and Resulted from Time of ED Arrival to Time of ED Departure - No data to display               Interventions:  Medications   HYDROcodone-acetaminophen (NORCO) 5-325 MG per tablet 2 tablet (2 tablets Oral $Given 6/24/25 0147)   methocarbamol (ROBAXIN) tablet 750 mg (750 mg Oral $Given 6/24/25 0148)        Impression & Plan    Independent Interpretation:    Medical Decision Making:  The patient presents for back pain.  Based on hx and exam, this is consistent with musculoskeletal back pain.  There are no red flag symptoms to suggest acute neurologic emergency, acute spinal cord or nerve root compression or cauda equina syndrome.  No fever or overt risk factors for infection.  I do not suspect referred pain from an intraabdominal or urologic process.  Given radicular pain, will treat with Medrol Dosepak.  Also encouraged scheduled Tylenol.  Robaxin.  Note for work for no lifting x 1 week.  Close follow-up with spine specialist.  Spine referral placed.  Discussed with patient that he needs to return if uncontrolled pain, fever, weakness or incontinence.  Patient expressed understanding.  Discharged home.  This visit was completed in its entirety with the assistance of a professional Telugu phone     Disposition:  Discharge    Diagnosis:    ICD-10-CM    1. Acute right-sided low back pain with right-sided sciatica  M54.41 Spine  Referral           Discharge Medications:  Discharge Medication List as of 6/24/2025  1:48 AM        START taking these medications    Details   methocarbamol (ROBAXIN) 750 MG tablet Take 1 tablet (750 mg) by mouth every 6 hours as needed for muscle spasms., Disp-20 tablet, R-0, E-Prescribe      methylPREDNISolone (MEDROL DOSEPAK) 4 MG tablet therapy pack Follow Package Directions, Disp-21 tablet, R-0, E-Prescribe              Deja Mercedes  June 24, 2025         Deja Mercedes MD  06/24/25 9634

## 2025-06-24 NOTE — DISCHARGE INSTRUCTIONS
Take tylenol 1000mg 3x per day.  Do not take more than 3000mg tylenol in 24 hours.    Take medrol dose pack (steroid) as prescribed.    Use robaxin, muscle relaxant, as prescribed as needed for muscle spasms.    Discharge Instructions  Back Pain  You were seen today for back pain. Back pain can have many causes, but most will get better without surgery or other specific treatment. Sometimes there is a herniated ( slipped ) disc. We do not usually do MRI scans to look for these right away, since most herniated discs will get better on their own with time.  Today, we did not find any evidence that your back pain was caused by a serious condition. However, sometimes symptoms develop over time and cannot be found during an emergency visit, so it is very important that you follow up with your primary provider.  Generally, every Emergency Department visit should have a follow-up clinic visit with either a primary or a specialty clinic/provider. Please follow-up as instructed by your emergency provider today.    Return to the Emergency Department if:  You develop a fever with your back pain.   You have weakness or change in sensation in one or both legs.  You lose control of your bowels or bladder, or cannot empty your bladder (cannot pee).  Your pain gets much worse.     Follow-up with your provider:  Unless your pain has completely gone away, please make an appointment with your provider within one week. Most of the routine care for back pain is available in a clinic and not the Emergency Department. You may need further management of your back pain, such as more pain medication, imaging such as an X-ray or MRI, or physical therapy.    What can I do to help myself?  Remain Active -- People are often afraid that they will hurt their back further or delay recovery by remaining active, but this is one of the best things you can do for your back. In fact, staying in bed for a long time to rest is not recommended. Studies  have shown that people with low back pain recover faster when they remain active. Movement helps to bring blood flow to the muscles and relieve muscle spasms as well as preventing loss of muscle strength.  Heat -- Using a heating pad can help with low back pain during the first few weeks. Do not sleep with a heating pad, as you can be burned.   Pain medications - You may take a pain medication such as Tylenol  (acetaminophen), Advil , Motrin  (ibuprofen) or Aleve  (naproxen).  If you were given a prescription for medicine here today, be sure to read all of the information (including the package insert) that comes with your prescription.  This will include important information about the medicine, its side effects, and any warnings that you need to know about.  The pharmacist who fills the prescription can provide more information and answer questions you may have about the medicine.  If you have questions or concerns that the pharmacist cannot address, please call or return to the Emergency Department.   Remember that you can always come back to the Emergency Department if you are not able to see your regular provider in the amount of time listed above, if you get any new symptoms, or if there is anything that worries you.

## 2025-06-24 NOTE — LETTER
Parth Valdez was seen and treated in our emergency department on 6/23/2025.  He may return to work on 6/25/25 with the following restrictions- no lifting greater than 10 lbs for 1 week.    Deja Mercedes MD

## 2025-07-01 NOTE — CONFIDENTIAL NOTE
NEUROSURGERY - NEW PREVISIT PLANNING    Referring Provider: Deja Mercedes MD    [facility] ED 06/24/25   Reason For Visit: M54.41 (ICD-10-CM) - Acute right-sided low back pain with right-sided sciatica        IMAGING STATUS/LOCATION DATE/TYPE   MRI N/A    CT N/A    XRAY PACS 08/10/2019  Lumbar  [facility]   NOTES STATUS/LOCATION DATE/TYPE   Other specialist [facility]: N/A    EMG N/A    INJECTION N/A    PHYSICAL THERAPY CE 05/09/2017   SURGERY N/A      Does patient have C2C?  Year last updated Action     YES   [x]   2018   Please update at appointment if outdated more than 5 years       NO     []   N/A   Please complete C2C at appointment

## 2025-07-03 ENCOUNTER — TELEPHONE (OUTPATIENT)
Dept: NEUROSURGERY | Facility: CLINIC | Age: 55
End: 2025-07-03
Payer: MEDICAID

## 2025-07-03 NOTE — TELEPHONE ENCOUNTER
Attempted to reach patient to remind them about appointment scheduled with Mihaela Louis NP on 7/7/25 in our [place] clinic.    A voicemail was left with a call back number if the patient has questions or would like to reschedule.

## 2025-07-07 ENCOUNTER — PRE VISIT (OUTPATIENT)
Dept: NEUROSURGERY | Facility: CLINIC | Age: 55
End: 2025-07-07

## 2025-07-07 ENCOUNTER — OFFICE VISIT (OUTPATIENT)
Dept: NEUROSURGERY | Facility: CLINIC | Age: 55
End: 2025-07-07
Payer: MEDICAID

## 2025-07-07 VITALS — HEART RATE: 60 BPM | DIASTOLIC BLOOD PRESSURE: 88 MMHG | OXYGEN SATURATION: 97 % | SYSTOLIC BLOOD PRESSURE: 144 MMHG

## 2025-07-07 DIAGNOSIS — M54.41 ACUTE RIGHT-SIDED LOW BACK PAIN WITH RIGHT-SIDED SCIATICA: ICD-10-CM

## 2025-07-07 RX ORDER — METHOCARBAMOL 750 MG/1
750 TABLET, FILM COATED ORAL EVERY 6 HOURS PRN
Qty: 20 TABLET | Refills: 0 | Status: SHIPPED | OUTPATIENT
Start: 2025-07-07

## 2025-07-07 ASSESSMENT — PAIN SCALES - GENERAL: PAINLEVEL_OUTOF10: SEVERE PAIN (10)

## 2025-07-07 NOTE — LETTER
7/7/2025      Parth Valdez  [address]  [facility] [address]      Dear Colleague,    Thank you for referring your patient, Parth Valdez, to the [facility] NEUROLOGY CLINICS [facility]. Please see a copy of my visit note below.    [facility] Neurosurgery  Neurosurgery Clinic Visit      CC: back and leg pain    Primary care Provider: [facility]    Reason For Visit:   I was asked by Dr. Deja Mercedes to consult on the patient for acute right-sided low back pain with right-sided sciatica.    HPI: Parth Valdez is a 54 year old male with a 2 week history of back and leg symptoms. No injury at the onset. Reports right-sided low back pain to the right posterolateral thigh to the calf. No paresthesias or overt weakness. No bowel/bladder complaints. Has had MDP with some improvement in symptoms. States symptoms interfere with sleep. No imaging. No therapy. Per chart review, has had similar findings in April 2025. Was referred to chiropractic therapy, however due to mild findings, didn't proceed with recommendations. He was also seen in Urgent Care on 6/30/2025 when he was prescribed additional oral sterids and therapy exercises.     Pain right now:  10    Past Medical History:   Diagnosis Date     Hernia, abdominal        Past Medical History reviewed with patient during visit.    Past Surgical History:   Procedure Laterality Date     HERNIA REPAIR       Past Surgical History reviewed with patient during visit.    Current Outpatient Medications   Medication Sig Dispense Refill     methocarbamol (ROBAXIN) 750 MG tablet Take 1 tablet (750 mg) by mouth every 6 hours as needed for muscle spasms. 20 tablet 0     methylPREDNISolone (MEDROL DOSEPAK) 4 MG tablet therapy pack Follow Package Directions (Patient not taking: Reported on 7/7/2025) 21 tablet 0     tamsulosin (FLOMAX) 0.4 MG capsule Take 1 capsule (0.4 mg) by mouth daily (Patient not taking:  Reported on 7/7/2025) 90 capsule 3     No current facility-administered medications for this visit.       Allergies   Allergen Reactions     Lisinopril-Hydrochlorothiazide Cough and Itching     Denies angioedema symptoms       Social History     Socioeconomic History     Marital status: Single     Spouse name: None     Number of children: None     Years of education: None     Highest education level: None   Tobacco Use     Smoking status: Some Days     Smokeless tobacco: Never   Substance and Sexual Activity     Alcohol use: No     Drug use: No     Sexual activity: Yes     Partners: Female     Birth control/protection: None       Family History   Problem Relation Age of Onset     Hypertension Mother      Diabetes Father          ROS: 10 point ROS neg other than the symptoms noted above in the HPI.    Vital Signs:   BP (!) 144/88   Pulse 60   SpO2 97%       Examination:  Constitutional:  Alert, well nourished, NAD.  Memory: recent and remote memory   HEENT: Normocephalic, atraumatic.   Pulm:  Without shortness of breath   CV:  No pitting edema of BLE.      Neurological:  Awake  Alert  Oriented x 3  Speech clear    Motor exam:   Hip Flexion:                 Right: 5/5  Left:  5/5  Hip Abduction:             Right:  5/5  Left:  5/5  Hip Adduction:             Right:  5/5  Left:  5/5  Plantar Flexion:           Right:  5/5  Left:  5/5  Dorsal Flexion:            Right:  5/5  Left:  5/5  EHL:                            Right:  5/5  Left:  5/5     Sensation normal to bilateral upper and lower extremities  Muscle tone to bilateral upper and lower extremities   Gait: Able to stand from a seated position. Normal non-antalgic, non-myelopathic gait.  Able to heel/toe walk without loss of balance    Lumbar examination reveals no tenderness of the spine or paraspinous muscles.  Hip height is symmetrical. Negative SI joint, sciatic notch or greater trochanteric tenderness to palpation bilaterally.  Straight leg raise is  positive on the right.     Imaging:   No imaging to review.     Assessment/Plan:   Acute right-sided low back pain with right-sided sciatica    Agree with referral to physical therapy as previously recommended. Will refer to PT in [place] per his preference. If symptoms persist or worsen, would recommend lumbar MRI for further evaluation. He verbalized understanding and agreement.     Patient Instructions   -Physical therapy ordered. They will contact you to schedule.  -Contact my office if your symptoms persist or worsen and we would likely obtain a lumbar MRI at that time.   -Please contact our clinic with questions or concerns at [phone].    Mihaela Louis CNP  [facility] Neurosurgery  [address] [place] [id number]  Tel [phone]  Fax [phone]      Again, thank you for allowing me to participate in the care of your patient.        Sincerely,        Mihaela Louis NP    Electronically signed

## 2025-07-07 NOTE — NURSING NOTE
Parth Valdez is a 54 year old male who presents for:  Chief Complaint   Patient presents with    Consult     Acute right-sided low back pain with right-sided sciatica / Deja Mercedes MD         Initial Vitals:  BP (!) 144/88   Pulse 60   SpO2 97%  Estimated body mass index is 35.13 kg/m  as calculated from the following:    Height as of 6/23/25: 6' (1.829 m).    Weight as of 6/23/25: 259 lb 0.7 oz (117.5 kg).. There is no height or weight on file to calculate BSA. BP completed using cuff size: large  Severe Pain (10)      Danette Moore

## 2025-07-07 NOTE — PATIENT INSTRUCTIONS
-Physical therapy ordered. They will contact you to schedule.  -Contact my office if your symptoms persist or worsen and we would likely obtain a lumbar MRI at that time.   -Please contact our clinic with questions or concerns at [phone].

## 2025-07-07 NOTE — PROGRESS NOTES
[facility] Neurosurgery  Neurosurgery Clinic Visit      CC: back and leg pain    Primary care Provider: [facility]    Reason For Visit:   I was asked by Dr. Deja Mercedes to consult on the patient for acute right-sided low back pain with right-sided sciatica.    HPI: Parth Valdez is a 54 year old male with a 2 week history of back and leg symptoms. No injury at the onset. Reports right-sided low back pain to the right posterolateral thigh to the calf. No paresthesias or overt weakness. No bowel/bladder complaints. Has had MDP with some improvement in symptoms. States symptoms interfere with sleep. No imaging. No therapy. Per chart review, has had similar findings in April 2025. Was referred to chiropractic therapy, however due to mild findings, didn't proceed with recommendations. He was also seen in Urgent Care on 6/30/2025 when he was prescribed additional oral sterids and therapy exercises.     Pain right now:  10    Past Medical History:   Diagnosis Date    Hernia, abdominal        Past Medical History reviewed with patient during visit.    Past Surgical History:   Procedure Laterality Date    HERNIA REPAIR       Past Surgical History reviewed with patient during visit.    Current Outpatient Medications   Medication Sig Dispense Refill    methocarbamol (ROBAXIN) 750 MG tablet Take 1 tablet (750 mg) by mouth every 6 hours as needed for muscle spasms. 20 tablet 0    methylPREDNISolone (MEDROL DOSEPAK) 4 MG tablet therapy pack Follow Package Directions (Patient not taking: Reported on 7/7/2025) 21 tablet 0    tamsulosin (FLOMAX) 0.4 MG capsule Take 1 capsule (0.4 mg) by mouth daily (Patient not taking: Reported on 7/7/2025) 90 capsule 3     No current facility-administered medications for this visit.       Allergies   Allergen Reactions    Lisinopril-Hydrochlorothiazide Cough and Itching     Denies angioedema symptoms       Social History     Socioeconomic History     Marital status: Single     Spouse name: None    Number of children: None    Years of education: None    Highest education level: None   Tobacco Use    Smoking status: Some Days    Smokeless tobacco: Never   Substance and Sexual Activity    Alcohol use: No    Drug use: No    Sexual activity: Yes     Partners: Female     Birth control/protection: None       Family History   Problem Relation Age of Onset    Hypertension Mother     Diabetes Father          ROS: 10 point ROS neg other than the symptoms noted above in the HPI.    Vital Signs:   BP (!) 144/88   Pulse 60   SpO2 97%       Examination:  Constitutional:  Alert, well nourished, NAD.  Memory: recent and remote memory   HEENT: Normocephalic, atraumatic.   Pulm:  Without shortness of breath   CV:  No pitting edema of BLE.      Neurological:  Awake  Alert  Oriented x 3  Speech clear    Motor exam:   Hip Flexion:                 Right: 5/5  Left:  5/5  Hip Abduction:             Right:  5/5  Left:  5/5  Hip Adduction:             Right:  5/5  Left:  5/5  Plantar Flexion:           Right:  5/5  Left:  5/5  Dorsal Flexion:            Right:  5/5  Left:  5/5  EHL:                            Right:  5/5  Left:  5/5     Sensation normal to bilateral upper and lower extremities  Muscle tone to bilateral upper and lower extremities   Gait: Able to stand from a seated position. Normal non-antalgic, non-myelopathic gait.  Able to heel/toe walk without loss of balance    Lumbar examination reveals no tenderness of the spine or paraspinous muscles.  Hip height is symmetrical. Negative SI joint, sciatic notch or greater trochanteric tenderness to palpation bilaterally.  Straight leg raise is positive on the right.     Imaging:   No imaging to review.     Assessment/Plan:   Acute right-sided low back pain with right-sided sciatica    Agree with referral to physical therapy as previously recommended. Will refer to PT in [place] per his preference. If symptoms persist or  worsen, would recommend lumbar MRI for further evaluation. He verbalized understanding and agreement.     Patient Instructions   -Physical therapy ordered. They will contact you to schedule.  -Contact my office if your symptoms persist or worsen and we would likely obtain a lumbar MRI at that time.   -Please contact our clinic with questions or concerns at [phone].    Mihaela Louis, [facility] Neurosurgery  [address] [place] [id number]  Tel [phone]  Fax [phone]

## 2025-07-09 ENCOUNTER — THERAPY VISIT (OUTPATIENT)
Dept: PHYSICAL THERAPY | Facility: CLINIC | Age: 55
End: 2025-07-09
Attending: NURSE PRACTITIONER
Payer: MEDICAID

## 2025-07-09 DIAGNOSIS — M54.41 ACUTE RIGHT-SIDED LOW BACK PAIN WITH RIGHT-SIDED SCIATICA: Primary | ICD-10-CM

## 2025-07-09 PROCEDURE — 97161 PT EVAL LOW COMPLEX 20 MIN: CPT | Mod: GP | Performed by: PHYSICAL THERAPIST

## 2025-07-09 PROCEDURE — 97110 THERAPEUTIC EXERCISES: CPT | Mod: GP | Performed by: PHYSICAL THERAPIST

## 2025-07-09 PROCEDURE — 97112 NEUROMUSCULAR REEDUCATION: CPT | Mod: GP | Performed by: PHYSICAL THERAPIST

## 2025-07-09 NOTE — PROGRESS NOTES
PHYSICAL THERAPY EVALUATION  Type of Visit: Evaluation        Fall Risk Screen:  Have you fallen 2 or more times in the past year?: No  Have you fallen and had an injury in the past year?: No  Is patient receiving Physical Therapy Services?: Yes    Subjective     Injury history  DOI: Sx starting a few weeks ago with leaning forward to  cartons at work. Similar findings in April. Started on a Friday, worse a few days later, moving from glutes into the LEs.     R LBP and pain to the R posterolateral thigh to calf.   Reporting pain all the time in the back and into the leg.  Reporting some right great toe numbness.     Has tried chiropractic care, oral steroids, and therapy exercises.     Aggravating: leaning forward (worsens pain distally), lifting  Alleviating: change in position    Current Function  Standing: tolerating okay  Walking: tolerating okay   Sitting: pain into glute and leg  Lifting: increased pain  Stairs: painful  Sleeping: Is improved since taking the steroids.         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   Hobbies/Interests:      Patient goals for therapy:         Objective     LUMBAR:    Posture: seated in chair with decreased lumbar lordosis, forward head posture.     Functional:  - Sit to stand: without use of UEs, slow transition  - Squat: painful with lumbar flexion  - SL squat: not  tested    Neurological:    Motor Deficit:  Myotomes L R   L1-2 (hip flexion) 5/5 5/5   L3 (knee extension) 5/5 5/5   L4 (ankle DF) 5/5 4/5   L5 (g. toe ext) 5/5 4/5   S1 (ankle PF or knee flex) 5/5 4/5       Dural Signs:   L R   Slump Mild pain  +   SLR - +       AROM: (Major, Moderate, Minimal or Nil loss)  Movement Loss [place] Mod Min Nil Pain   Flexion    x +   Extension   x  - relieves   Side Gliding L   x     Side Gliding R   x           Repeated Motions Testing: extension reduces pain    Palpation: denies tenderness at lumbar extensor musculature, QL, glutes, central PA without pain    Other Tests (bilateral unless otherwise noted):   MALU: neg  FABIR: neg  Knee to chest: neg  Scour: neg      Assessment & Plan   CLINICAL IMPRESSIONS  Medical Diagnosis: Acute right-sided low back pain with right-sided sciatica    Treatment Diagnosis: Acute LBP with radicular pain into right LE   Impression/Assessment: Patient is a 54 year old male with central LBP with radicular pain into the right LE complaints.  The following significant findings have been identified: Pain, Decreased ROM/flexibility, Decreased strength, Impaired sensation, Impaired muscle performance, Decreased activity tolerance, and Impaired posture. These impairments interfere with their ability to perform self care tasks, work tasks, recreational activities, household chores, driving , household mobility, and community mobility as compared to previous level of function.     Clinical Decision Making (Complexity):  Clinical Presentation: Stable/Uncomplicated  Clinical Presentation Rationale: based on medical and personal factors listed in PT evaluation  Clinical Decision Making (Complexity): Low complexity    PLAN OF CARE  Treatment Interventions:  Modalities: Biofeedback, Cryotherapy, Cupping, Dry Needling, E-stim, Hot Pack, Ultrasound  Interventions: Gait Training, Manual Therapy, Neuromuscular Re-education, Therapeutic Activity, Therapeutic Exercise,  Self-Care/Home Management    Long Term Goals     PT Goal 1  Goal Identifier: Goal 1  Goal Description: He will be able lift at work without increased pain.  Rationale: to maximize safety and independence with performance of ADLs and functional tasks;to maximize safety and independence within the community  Target Date: 09/17/25  PT Goal 2  Goal Identifier: Goal 2  Goal Description: Pt will be able to sleep throughout the night without waking due to pain  Rationale: to maximize safety and independence with performance of ADLs and functional tasks  Target Date: 09/17/25      Frequency of Treatment: 1x/week  Duration of Treatment: 10 weeks    Education Assessment:        Risks and benefits of evaluation/treatment have been explained.   Patient/Family/caregiver agrees with Plan of Care.     Evaluation Time:          Signing Clinician: Chanel Shetty PT        [facility]                                                                                   OUTPATIENT PHYSICAL THERAPY      PLAN OF TREATMENT FOR OUTPATIENT REHABILITATION   Patient's Last Name, First Name, M.Parth Caro YOB: 1970   Provider's Name   [facility]   Medical Record No.  [id number]     Onset Date: 07/07/25 (date of PT orders)  Start of Care Date: 07/09/25     Medical Diagnosis:  Acute right-sided low back pain with right-sided sciatica      PT Treatment Diagnosis:  Acute LBP with radicular pain into right LE Plan of Treatment  Frequency/Duration: 1x/week/ 10 weeks    Certification date from 07/09/25 to 09/17/25         See note for plan of treatment details and functional goals     Chanel Shetty, PT                         I CERTIFY THE NEED FOR THESE SERVICES FURNISHED UNDER        THIS PLAN OF TREATMENT AND WHILE UNDER MY CARE     (Physician attestation of this document indicates review and certification of the therapy plan).              Referring  Provider:  Mihaela Louis    Initial Assessment  See Epic Evaluation- Start of Care Date: 07/09/25

## 2025-08-04 ENCOUNTER — PATIENT OUTREACH (OUTPATIENT)
Dept: CARE COORDINATION | Facility: CLINIC | Age: 55
End: 2025-08-04
Payer: MEDICAID

## 2025-08-13 ENCOUNTER — PATIENT OUTREACH (OUTPATIENT)
Dept: CARE COORDINATION | Facility: CLINIC | Age: 55
End: 2025-08-13
Payer: MEDICAID